# Patient Record
Sex: FEMALE | Race: BLACK OR AFRICAN AMERICAN | NOT HISPANIC OR LATINO | Employment: FULL TIME | URBAN - METROPOLITAN AREA
[De-identification: names, ages, dates, MRNs, and addresses within clinical notes are randomized per-mention and may not be internally consistent; named-entity substitution may affect disease eponyms.]

---

## 2020-03-19 ENCOUNTER — NURSE TRIAGE (OUTPATIENT)
Dept: ADMINISTRATIVE | Facility: CLINIC | Age: 25
End: 2020-03-19

## 2020-03-19 NOTE — TELEPHONE ENCOUNTER
"Patient called in with c/o sore throat x 4 days ago.patient rates throat pain 1/10.  Patient denied fever. Patient reports 4/10 headaches. Denied body aches, denied diarrhea, vomiting. Patient was advised per protocol and was advised to call back with questions, concerns or symptoms and verbalized understanding.     Reason for Disposition   [1] Sore throat is the only symptom AND [2] present > 48 hours    Additional Information   Negative: Severe difficulty breathing (e.g., struggling for each breath, speaks in single words, stridor)   Negative: Sounds like a life-threatening emergency to the triager   Negative: [1] Diagnosed strep throat AND [2] taking antibiotic AND [3] symptoms continue   Negative: Throat culture results, call about   Negative: Productive cough is main symptom   Negative: Non-productive cough is main symptom   Negative: Hoarseness is main symptom   Negative: Runny nose is main symptom   Negative: [1] Drooling or spitting out saliva (because can't swallow) AND [2] normal breathing   Negative: Unable to open mouth completely   Negative: [1] Difficulty breathing AND [2] not severe   Negative: Fever > 104 F (40 C)   Negative: [1] Refuses to drink anything AND [2] for > 12 hours   Negative: [1] Drinking very little AND [2] dehydration suspected (e.g., no urine > 12 hours, very dry mouth, very lightheaded)   Negative: Patient sounds very sick or weak to the triager   Negative: SEVERE (e.g., excruciating) throat pain   Negative: [1] Pus on tonsils (back of throat) AND [2]  fever AND [3] swollen neck lymph nodes ("glands")   Negative: [1] Rash AND [2] widespread (especially chest and abdomen)   Negative: Earache also present   Negative: Fever present > 3 days (72 hours)   Negative: Diabetes mellitus or weak immune system (e.g., HIV positive, cancer chemo, splenectomy, organ transplant)   Negative: History of rheumatic fever   Negative: [1] Adult is leaving on a trip AND [2] " requests an antibiotic NOW   Negative: [1] Positive throat culture or rapid strep test (according to lab, PCP, caller, etc.) AND [2] NO  standing order to call in prescription for antibiotic   Negative: [1] Exposure to family member (or spouse or boyfriend/girlfriend) with test-proven strep AND [2] within last 10 days    Protocols used: SORE THROAT-A-AH

## 2020-07-14 ENCOUNTER — OFFICE VISIT (OUTPATIENT)
Dept: OBSTETRICS AND GYNECOLOGY | Facility: CLINIC | Age: 25
End: 2020-07-14
Payer: MEDICAID

## 2020-07-14 VITALS
BODY MASS INDEX: 38.43 KG/M2 | SYSTOLIC BLOOD PRESSURE: 120 MMHG | DIASTOLIC BLOOD PRESSURE: 68 MMHG | WEIGHT: 245.38 LBS

## 2020-07-14 DIAGNOSIS — Z01.419 ENCOUNTER FOR WELL WOMAN EXAM WITH ROUTINE GYNECOLOGICAL EXAM: Primary | ICD-10-CM

## 2020-07-14 DIAGNOSIS — Z30.015 ENCOUNTER FOR INITIAL PRESCRIPTION OF VAGINAL RING HORMONAL CONTRACEPTIVE: ICD-10-CM

## 2020-07-14 DIAGNOSIS — Z11.3 SCREEN FOR STD (SEXUALLY TRANSMITTED DISEASE): ICD-10-CM

## 2020-07-14 LAB
B-HCG UR QL: NEGATIVE
CTP QC/QA: YES

## 2020-07-14 PROCEDURE — 99999 PR PBB SHADOW E&M-EST. PATIENT-LVL III: ICD-10-PCS | Mod: PBBFAC,,, | Performed by: NURSE PRACTITIONER

## 2020-07-14 PROCEDURE — 87491 CHLMYD TRACH DNA AMP PROBE: CPT

## 2020-07-14 PROCEDURE — 99385 PR PREVENTIVE VISIT,NEW,18-39: ICD-10-PCS | Mod: S$PBB,,, | Performed by: NURSE PRACTITIONER

## 2020-07-14 PROCEDURE — 88175 CYTOPATH C/V AUTO FLUID REDO: CPT

## 2020-07-14 PROCEDURE — 99385 PREV VISIT NEW AGE 18-39: CPT | Mod: S$PBB,,, | Performed by: NURSE PRACTITIONER

## 2020-07-14 PROCEDURE — 99213 OFFICE O/P EST LOW 20 MIN: CPT | Mod: PBBFAC,PN | Performed by: NURSE PRACTITIONER

## 2020-07-14 PROCEDURE — 99999 PR PBB SHADOW E&M-EST. PATIENT-LVL III: CPT | Mod: PBBFAC,,, | Performed by: NURSE PRACTITIONER

## 2020-07-14 RX ORDER — ETONOGESTREL AND ETHINYL ESTRADIOL VAGINAL RING .015; .12 MG/D; MG/D
1 RING VAGINAL
Qty: 1 EACH | Refills: 11 | Status: SHIPPED | OUTPATIENT
Start: 2020-07-14 | End: 2020-07-22 | Stop reason: SDUPTHER

## 2020-07-18 LAB
C TRACH DNA SPEC QL NAA+PROBE: NOT DETECTED
N GONORRHOEA DNA SPEC QL NAA+PROBE: NOT DETECTED

## 2020-07-22 DIAGNOSIS — Z30.015 ENCOUNTER FOR INITIAL PRESCRIPTION OF VAGINAL RING HORMONAL CONTRACEPTIVE: ICD-10-CM

## 2020-07-23 LAB
FINAL PATHOLOGIC DIAGNOSIS: NORMAL
Lab: NORMAL

## 2020-07-23 RX ORDER — ETONOGESTREL AND ETHINYL ESTRADIOL VAGINAL RING .015; .12 MG/D; MG/D
1 RING VAGINAL
Qty: 1 EACH | Refills: 11 | Status: SHIPPED | OUTPATIENT
Start: 2020-07-23 | End: 2021-08-05 | Stop reason: SDUPTHER

## 2020-07-24 ENCOUNTER — TELEPHONE (OUTPATIENT)
Dept: OBSTETRICS AND GYNECOLOGY | Facility: CLINIC | Age: 25
End: 2020-07-24

## 2020-07-24 NOTE — TELEPHONE ENCOUNTER
----- Message from ANTONIO Lopez sent at 7/23/2020  6:12 PM CDT -----  Pap is normal. Repeat in 3 years.

## 2020-08-07 ENCOUNTER — OFFICE VISIT (OUTPATIENT)
Dept: OBSTETRICS AND GYNECOLOGY | Facility: CLINIC | Age: 25
End: 2020-08-07
Payer: MEDICAID

## 2020-08-07 VITALS
BODY MASS INDEX: 38.88 KG/M2 | WEIGHT: 248.25 LBS | DIASTOLIC BLOOD PRESSURE: 76 MMHG | SYSTOLIC BLOOD PRESSURE: 124 MMHG

## 2020-08-07 DIAGNOSIS — N76.0 ACUTE VAGINITIS: Primary | ICD-10-CM

## 2020-08-07 PROCEDURE — 99213 OFFICE O/P EST LOW 20 MIN: CPT | Mod: PBBFAC,PN | Performed by: NURSE PRACTITIONER

## 2020-08-07 PROCEDURE — 99999 PR PBB SHADOW E&M-EST. PATIENT-LVL III: CPT | Mod: PBBFAC,,, | Performed by: NURSE PRACTITIONER

## 2020-08-07 PROCEDURE — 99999 PR PBB SHADOW E&M-EST. PATIENT-LVL III: ICD-10-PCS | Mod: PBBFAC,,, | Performed by: NURSE PRACTITIONER

## 2020-08-07 PROCEDURE — 99214 OFFICE O/P EST MOD 30 MIN: CPT | Mod: S$PBB,,, | Performed by: NURSE PRACTITIONER

## 2020-08-07 PROCEDURE — 99214 PR OFFICE/OUTPT VISIT, EST, LEVL IV, 30-39 MIN: ICD-10-PCS | Mod: S$PBB,,, | Performed by: NURSE PRACTITIONER

## 2020-08-07 RX ORDER — FLUCONAZOLE 150 MG/1
150 TABLET ORAL ONCE
Qty: 1 TABLET | Refills: 1 | Status: SHIPPED | OUTPATIENT
Start: 2020-08-07 | End: 2020-08-07

## 2020-08-14 ENCOUNTER — TELEPHONE (OUTPATIENT)
Dept: OBSTETRICS AND GYNECOLOGY | Facility: CLINIC | Age: 25
End: 2020-08-14

## 2020-08-14 NOTE — TELEPHONE ENCOUNTER
----- Message from Queta Mckeon sent at 8/14/2020 12:48 PM CDT -----  Regarding: MEDICATION  Name of Who is Calling: POORNIMA MCKENO [[id number]]      What is the request in detail: patient is requesting a call back she states she was suppose to have (DIFLUCAN) 150 MG  medication called in for her and it has not been called in yet       Can the clinic reply by [facility]: no      What Number to Call Back if not in [facility]: [phone]

## 2020-10-22 NOTE — PROGRESS NOTES
Chief Complaint: Well Woman Exam     HPI:      Ghada Mckeon is a 25 y.o.  who presents today for well woman exam.  LMP: Patient's last menstrual period was 07/10/2020.   Patient is currently sexually active with a single male partner. She is currently using condoms for contraception. She would like STD screening today. Ms. Mckeon confirms that she is safe at home.  Ms. Mckeon denies abnormal vaginal bleeding, discharge, pelvic pain, urinary problems, or changes in appetite.    Previous Pap:  WNL per pt (~2 years) Denies hx of abnormal paps    Gardasil:Completed     Family History   Problem Relation Age of Onset    Breast cancer Neg Hx     Colon cancer Neg Hx     Ovarian cancer Neg Hx      OB History        2    Para   1    Term   1            AB        Living   2       SAB        TAB        Ectopic        Multiple        Live Births   1                 ROS:     GENERAL: Denies unintentional weight gain or weight loss. Feeling well overall.   SKIN: Denies rash or lesions.   HEENT: Denies headaches, or vision changes.   CARDIOVASCULAR: Denies palpitations or chest pain.   RESPIRATORY: Denies shortness of breath or dyspnea on exertion.  BREASTS: Denies pain, lumps, or nipple discharge.   ABDOMEN: Denies abdominal pain, constipation, diarrhea, nausea, vomiting, change in appetite.  URINARY: Denies frequency, dysuria, hematuria.  NEUROLOGIC: Denies syncope or weakness.   PSYCHIATRIC: Denies depression, anxiety or mood swings.    Physical Exam:      PHYSICAL EXAM:  /68   Wt 111.3 kg (245 lb 6 oz)   LMP 07/10/2020   BMI 38.43 kg/m²   Body mass index is 38.43 kg/m².     APPEARANCE: Well nourished, well developed, in no acute distress.  PSYCH: Appropriate mood and affect.  SKIN: No acne or hirsutism  NECK: Neck symmetric without masses or thyromegaly  NODES: No inguinal, axillary, or supraclavicular lymph node enlargement  ABDOMEN: Soft.  No tenderness or masses.    CARDIOVASCULAR:  No edema of peripheral extremities  BREASTS: Symmetrical, no skin changes or visible lesions.  No palpable masses or nipple discharge bilaterally.  PELVIC: Normal external genitalia without lesions.  Normal hair distribution.  Adequate perineal body, normal urethral meatus.  Vagina moist and well rugated without lesions or discharge.  Cervix pink, without lesions, discharge or tenderness.  No significant cystocele or rectocele.  Bimanual exam shows uterus to be normal size, regular, mobile and nontender.  Adnexa without masses or tenderness.      Assessment/Plan:     Encounter for well woman exam with routine gynecological exam  -     Liquid-Based Pap Smear, Screening    Screen for STD (sexually transmitted disease)  -     C. trachomatis/N. gonorrhoeae by AMP DNA    Encounter for initial prescription of vaginal ring hormonal contraceptive  -     POCT urine pregnancy  -     Discontinue: etonogestreL-ethinyl estradioL (NUVARING) 0.12-0.015 mg/24 hr vaginal ring; Place 1 each vaginally every 21 days. Insert one (1) ring vaginally and leave in place for three (3) weeks, then remove for one (1) week.  Dispense: 1 each; Refill: 11        Counseling:     Patient was counseled today on current ASCCP pap guidelines, the recommendation for yearly pelvic exams, healthy diet and exercise routines, breast self awareness.She is to see her PCP for other health maintenance.     The use of hormonal contraception has been fully discussed with the patient. We discussed all options including OCPs, transdermal patches, vaginal ring, Depo Provera injections, Implanon, and IUD. Warnings about anticipated minor side effects such as breakthrough spotting, nausea, breast tenderness, weight changes, acne, headaches, etc were given. She has been told of the more serious potential side effects such as MI, stroke, and deep vein thrombosis, all of which are very unlikely. She has been asked to report any signs of such serious problems  immediately. The need for additional protection, such as a condom, to prevent exposure to sexually transmitted diseases has also been discussed- the patient has been clearly reminded that no hormonal contraceptive method can protect her against diseases such as HIV and others. She understands and wishes to take the medication as prescribed. She wishes to begin Nuvaring (estrogen/progesterone).

## 2021-02-26 ENCOUNTER — IMMUNIZATION (OUTPATIENT)
Dept: PRIMARY CARE CLINIC | Facility: CLINIC | Age: 26
End: 2021-02-26
Payer: MEDICAID

## 2021-02-26 DIAGNOSIS — Z23 NEED FOR VACCINATION: Primary | ICD-10-CM

## 2021-02-26 PROCEDURE — 0011A COVID-19, MRNA, LNP-S, PF, 100 MCG/0.5 ML DOSE VACCINE: CPT | Mod: PBBFAC,PN

## 2021-03-26 ENCOUNTER — IMMUNIZATION (OUTPATIENT)
Dept: PRIMARY CARE CLINIC | Facility: CLINIC | Age: 26
End: 2021-03-26
Payer: MEDICAID

## 2021-03-26 DIAGNOSIS — Z23 NEED FOR VACCINATION: Primary | ICD-10-CM

## 2021-03-26 PROCEDURE — 0012A COVID-19, MRNA, LNP-S, PF, 100 MCG/0.5 ML DOSE VACCINE: ICD-10-PCS | Mod: CV19,,, | Performed by: FAMILY MEDICINE

## 2021-03-26 PROCEDURE — 91301 COVID-19, MRNA, LNP-S, PF, 100 MCG/0.5 ML DOSE VACCINE: CPT | Mod: ,,, | Performed by: FAMILY MEDICINE

## 2021-03-26 PROCEDURE — 91301 COVID-19, MRNA, LNP-S, PF, 100 MCG/0.5 ML DOSE VACCINE: ICD-10-PCS | Mod: ,,, | Performed by: FAMILY MEDICINE

## 2021-03-26 PROCEDURE — 0012A COVID-19, MRNA, LNP-S, PF, 100 MCG/0.5 ML DOSE VACCINE: CPT | Mod: CV19,,, | Performed by: FAMILY MEDICINE

## 2021-04-09 ENCOUNTER — TELEPHONE (OUTPATIENT)
Dept: OBSTETRICS AND GYNECOLOGY | Facility: CLINIC | Age: 26
End: 2021-04-09

## 2021-08-04 ENCOUNTER — PATIENT MESSAGE (OUTPATIENT)
Dept: OBSTETRICS AND GYNECOLOGY | Facility: CLINIC | Age: 26
End: 2021-08-04

## 2021-08-05 DIAGNOSIS — Z30.015 ENCOUNTER FOR INITIAL PRESCRIPTION OF VAGINAL RING HORMONAL CONTRACEPTIVE: ICD-10-CM

## 2021-08-05 RX ORDER — ETONOGESTREL AND ETHINYL ESTRADIOL VAGINAL RING .015; .12 MG/D; MG/D
1 RING VAGINAL
Qty: 1 EACH | Refills: 0 | Status: SHIPPED | OUTPATIENT
Start: 2021-08-05 | End: 2021-11-29 | Stop reason: SDUPTHER

## 2021-11-29 ENCOUNTER — PATIENT MESSAGE (OUTPATIENT)
Dept: OBSTETRICS AND GYNECOLOGY | Facility: CLINIC | Age: 26
End: 2021-11-29

## 2021-11-29 ENCOUNTER — OFFICE VISIT (OUTPATIENT)
Dept: OBSTETRICS AND GYNECOLOGY | Facility: CLINIC | Age: 26
End: 2021-11-29
Payer: MEDICAID

## 2021-11-29 VITALS
BODY MASS INDEX: 41.25 KG/M2 | HEIGHT: 67 IN | DIASTOLIC BLOOD PRESSURE: 82 MMHG | SYSTOLIC BLOOD PRESSURE: 118 MMHG | WEIGHT: 262.81 LBS

## 2021-11-29 DIAGNOSIS — Z30.015 ENCOUNTER FOR INITIAL PRESCRIPTION OF VAGINAL RING HORMONAL CONTRACEPTIVE: ICD-10-CM

## 2021-11-29 DIAGNOSIS — N89.8 VAGINAL DISCHARGE: ICD-10-CM

## 2021-11-29 DIAGNOSIS — Z01.419 WELL WOMAN EXAM WITH ROUTINE GYNECOLOGICAL EXAM: Primary | ICD-10-CM

## 2021-11-29 DIAGNOSIS — N92.6 MISSED MENSES: ICD-10-CM

## 2021-11-29 DIAGNOSIS — Z11.3 ROUTINE SCREENING FOR STI (SEXUALLY TRANSMITTED INFECTION): ICD-10-CM

## 2021-11-29 LAB
B-HCG UR QL: NEGATIVE
CTP QC/QA: YES

## 2021-11-29 PROCEDURE — 99395 PREV VISIT EST AGE 18-39: CPT | Mod: S$PBB,,, | Performed by: STUDENT IN AN ORGANIZED HEALTH CARE EDUCATION/TRAINING PROGRAM

## 2021-11-29 PROCEDURE — 87661 TRICHOMONAS VAGINALIS AMPLIF: CPT | Mod: 59 | Performed by: STUDENT IN AN ORGANIZED HEALTH CARE EDUCATION/TRAINING PROGRAM

## 2021-11-29 PROCEDURE — 99395 PR PREVENTIVE VISIT,EST,18-39: ICD-10-PCS | Mod: S$PBB,,, | Performed by: STUDENT IN AN ORGANIZED HEALTH CARE EDUCATION/TRAINING PROGRAM

## 2021-11-29 PROCEDURE — 99999 PR PBB SHADOW E&M-EST. PATIENT-LVL III: ICD-10-PCS | Mod: PBBFAC,,, | Performed by: STUDENT IN AN ORGANIZED HEALTH CARE EDUCATION/TRAINING PROGRAM

## 2021-11-29 PROCEDURE — 87481 CANDIDA DNA AMP PROBE: CPT | Mod: 59 | Performed by: STUDENT IN AN ORGANIZED HEALTH CARE EDUCATION/TRAINING PROGRAM

## 2021-11-29 PROCEDURE — 81025 URINE PREGNANCY TEST: CPT | Mod: PBBFAC,PN | Performed by: STUDENT IN AN ORGANIZED HEALTH CARE EDUCATION/TRAINING PROGRAM

## 2021-11-29 PROCEDURE — 87591 N.GONORRHOEAE DNA AMP PROB: CPT | Mod: 59 | Performed by: STUDENT IN AN ORGANIZED HEALTH CARE EDUCATION/TRAINING PROGRAM

## 2021-11-29 PROCEDURE — 87491 CHLMYD TRACH DNA AMP PROBE: CPT | Performed by: STUDENT IN AN ORGANIZED HEALTH CARE EDUCATION/TRAINING PROGRAM

## 2021-11-29 PROCEDURE — 99213 OFFICE O/P EST LOW 20 MIN: CPT | Mod: PBBFAC,PN | Performed by: STUDENT IN AN ORGANIZED HEALTH CARE EDUCATION/TRAINING PROGRAM

## 2021-11-29 PROCEDURE — 99999 PR PBB SHADOW E&M-EST. PATIENT-LVL III: CPT | Mod: PBBFAC,,, | Performed by: STUDENT IN AN ORGANIZED HEALTH CARE EDUCATION/TRAINING PROGRAM

## 2021-11-29 RX ORDER — ETONOGESTREL AND ETHINYL ESTRADIOL VAGINAL RING .015; .12 MG/D; MG/D
1 RING VAGINAL
Qty: 1 EACH | Refills: 11 | Status: SHIPPED | OUTPATIENT
Start: 2021-11-29 | End: 2023-05-10 | Stop reason: CLARIF

## 2021-12-02 LAB
C TRACH DNA SPEC QL NAA+PROBE: NOT DETECTED
N GONORRHOEA DNA SPEC QL NAA+PROBE: NOT DETECTED

## 2021-12-04 LAB
BACTERIAL VAGINOSIS DNA: NEGATIVE
CANDIDA GLABRATA DNA: NEGATIVE
CANDIDA KRUSEI DNA: NEGATIVE
CANDIDA RRNA VAG QL PROBE: POSITIVE
T VAGINALIS RRNA GENITAL QL PROBE: NEGATIVE

## 2021-12-06 RX ORDER — FLUCONAZOLE 150 MG/1
150 TABLET ORAL ONCE
Qty: 1 TABLET | Refills: 1 | Status: SHIPPED | OUTPATIENT
Start: 2021-12-06 | End: 2021-12-06

## 2021-12-21 ENCOUNTER — LAB VISIT (OUTPATIENT)
Dept: PRIMARY CARE CLINIC | Facility: OTHER | Age: 26
End: 2021-12-21
Payer: MEDICAID

## 2021-12-21 DIAGNOSIS — R68.83 CHILLS: ICD-10-CM

## 2021-12-21 DIAGNOSIS — R68.83 CHILLS: Primary | ICD-10-CM

## 2021-12-21 LAB
CTP QC/QA: YES
SARS-COV-2 RDRP RESP QL NAA+PROBE: POSITIVE

## 2021-12-21 PROCEDURE — U0002 COVID-19 LAB TEST NON-CDC: HCPCS | Mod: QW,,, | Performed by: PREVENTIVE MEDICINE

## 2021-12-21 PROCEDURE — U0002: ICD-10-PCS | Mod: QW,,, | Performed by: PREVENTIVE MEDICINE

## 2021-12-24 ENCOUNTER — PATIENT MESSAGE (OUTPATIENT)
Dept: PRIMARY CARE CLINIC | Facility: CLINIC | Age: 26
End: 2021-12-24
Payer: MEDICAID

## 2022-01-04 ENCOUNTER — OFFICE VISIT (OUTPATIENT)
Dept: PRIMARY CARE CLINIC | Facility: CLINIC | Age: 27
End: 2022-01-04
Payer: MEDICAID

## 2022-01-04 ENCOUNTER — PATIENT MESSAGE (OUTPATIENT)
Dept: OBSTETRICS AND GYNECOLOGY | Facility: CLINIC | Age: 27
End: 2022-01-04
Payer: MEDICAID

## 2022-01-04 VITALS
DIASTOLIC BLOOD PRESSURE: 76 MMHG | RESPIRATION RATE: 16 BRPM | HEART RATE: 90 BPM | TEMPERATURE: 98 F | WEIGHT: 263.31 LBS | OXYGEN SATURATION: 97 % | HEIGHT: 67 IN | BODY MASS INDEX: 41.33 KG/M2 | SYSTOLIC BLOOD PRESSURE: 120 MMHG

## 2022-01-04 DIAGNOSIS — U07.1 COVID-19 VIRUS INFECTION: Primary | ICD-10-CM

## 2022-01-04 DIAGNOSIS — R05.8 POST-VIRAL COUGH SYNDROME: ICD-10-CM

## 2022-01-04 DIAGNOSIS — G47.00 INSOMNIA, UNSPECIFIED TYPE: ICD-10-CM

## 2022-01-04 PROCEDURE — 3078F PR MOST RECENT DIASTOLIC BLOOD PRESSURE < 80 MM HG: ICD-10-PCS | Mod: CPTII,,, | Performed by: STUDENT IN AN ORGANIZED HEALTH CARE EDUCATION/TRAINING PROGRAM

## 2022-01-04 PROCEDURE — 99999 PR PBB SHADOW E&M-EST. PATIENT-LVL IV: ICD-10-PCS | Mod: PBBFAC,,, | Performed by: STUDENT IN AN ORGANIZED HEALTH CARE EDUCATION/TRAINING PROGRAM

## 2022-01-04 PROCEDURE — 3074F SYST BP LT 130 MM HG: CPT | Mod: CPTII,,, | Performed by: STUDENT IN AN ORGANIZED HEALTH CARE EDUCATION/TRAINING PROGRAM

## 2022-01-04 PROCEDURE — 99214 OFFICE O/P EST MOD 30 MIN: CPT | Mod: PBBFAC,PN | Performed by: STUDENT IN AN ORGANIZED HEALTH CARE EDUCATION/TRAINING PROGRAM

## 2022-01-04 PROCEDURE — 3074F PR MOST RECENT SYSTOLIC BLOOD PRESSURE < 130 MM HG: ICD-10-PCS | Mod: CPTII,,, | Performed by: STUDENT IN AN ORGANIZED HEALTH CARE EDUCATION/TRAINING PROGRAM

## 2022-01-04 PROCEDURE — 1159F PR MEDICATION LIST DOCUMENTED IN MEDICAL RECORD: ICD-10-PCS | Mod: CPTII,,, | Performed by: STUDENT IN AN ORGANIZED HEALTH CARE EDUCATION/TRAINING PROGRAM

## 2022-01-04 PROCEDURE — 99999 PR PBB SHADOW E&M-EST. PATIENT-LVL IV: CPT | Mod: PBBFAC,,, | Performed by: STUDENT IN AN ORGANIZED HEALTH CARE EDUCATION/TRAINING PROGRAM

## 2022-01-04 PROCEDURE — 3008F PR BODY MASS INDEX (BMI) DOCUMENTED: ICD-10-PCS | Mod: CPTII,,, | Performed by: STUDENT IN AN ORGANIZED HEALTH CARE EDUCATION/TRAINING PROGRAM

## 2022-01-04 PROCEDURE — 3008F BODY MASS INDEX DOCD: CPT | Mod: CPTII,,, | Performed by: STUDENT IN AN ORGANIZED HEALTH CARE EDUCATION/TRAINING PROGRAM

## 2022-01-04 PROCEDURE — 1160F PR REVIEW ALL MEDS BY PRESCRIBER/CLIN PHARMACIST DOCUMENTED: ICD-10-PCS | Mod: CPTII,,, | Performed by: STUDENT IN AN ORGANIZED HEALTH CARE EDUCATION/TRAINING PROGRAM

## 2022-01-04 PROCEDURE — 99214 PR OFFICE/OUTPT VISIT, EST, LEVL IV, 30-39 MIN: ICD-10-PCS | Mod: S$PBB,,, | Performed by: STUDENT IN AN ORGANIZED HEALTH CARE EDUCATION/TRAINING PROGRAM

## 2022-01-04 PROCEDURE — 3078F DIAST BP <80 MM HG: CPT | Mod: CPTII,,, | Performed by: STUDENT IN AN ORGANIZED HEALTH CARE EDUCATION/TRAINING PROGRAM

## 2022-01-04 PROCEDURE — 1159F MED LIST DOCD IN RCRD: CPT | Mod: CPTII,,, | Performed by: STUDENT IN AN ORGANIZED HEALTH CARE EDUCATION/TRAINING PROGRAM

## 2022-01-04 PROCEDURE — 1160F RVW MEDS BY RX/DR IN RCRD: CPT | Mod: CPTII,,, | Performed by: STUDENT IN AN ORGANIZED HEALTH CARE EDUCATION/TRAINING PROGRAM

## 2022-01-04 PROCEDURE — 99214 OFFICE O/P EST MOD 30 MIN: CPT | Mod: S$PBB,,, | Performed by: STUDENT IN AN ORGANIZED HEALTH CARE EDUCATION/TRAINING PROGRAM

## 2022-01-04 RX ORDER — MELATONIN 5 MG
1 CAPSULE ORAL NIGHTLY
Qty: 90 CAPSULE | Refills: 1
Start: 2022-01-04 | End: 2023-05-10 | Stop reason: CLARIF

## 2022-01-04 RX ORDER — BENZONATATE 100 MG/1
100-200 CAPSULE ORAL 3 TIMES DAILY PRN
Qty: 40 CAPSULE | Refills: 0 | Status: SHIPPED | OUTPATIENT
Start: 2022-01-04 | End: 2022-01-14

## 2022-01-04 NOTE — LETTER
January 4, 2022      [facility] [id number], [facility] [place] [address]  [facility] [address]  Phone: [phone]  Fax: [phone]       Patient: Ghada Mckeon   YOB: 1995  Date of Visit: 01/04/2022    To Whom It May Concern:    Yaritza Mckeon  was at [facility] on 01/04/2022. The patient may return to work/school on 01/04/2022 with no restrictions. If you have any questions or concerns, or if I can be of further assistance, please do not hesitate to contact me.    Sincerely,            Elmer Souza MD

## 2022-01-04 NOTE — LETTER
January 4, 2022      [facility] [id number], [place] [address]  [place] [address]  Phone: [phone]  Fax: [phone]       Patient: Ghada Mckeon   YOB: 1995  Date of Visit: 01/04/2022    To Whom It May Concern:    Yaritza Mckeon  was at [facility] on 01/04/2022. The patient has surpassed the normal quarantine period of 5 days due to persistent symptoms (chills, sore throat, cough and fatigue) from 12/21/2021-1/04/2022. Patient is now cleared to return to work on 1/04/2022.  If you have any questions or concerns, or if I can be of further assistance, please do not hesitate to contact me.    Sincerely,          Elmer Souza

## 2022-01-04 NOTE — PROGRESS NOTES
"Subjective:           Patient ID: Ghada Mckeon is a 26 y.o. female who presents today with a chief complaint of work clearance.    Chief Complaint:   work clearance      History of Present Illness:    26-year-old female presenting to clinic today for clearance to return to work after COVID infection.    Patient had positive COVID test on 12/21/2021 (14 days ago), patient has some residual cough but denies other symptoms at this time.    States that 1 week after postive test was still having chills, fatigue, sore throat and cough.  Over last 2-3 days s/s had decreased and patient's only current complaint it a residual post-viral cough.     Today patient's vitals show SpO2 is 97%.  Temperature is 98.3°.      Review of Systems   Constitutional: Negative for activity change, fatigue, fever and unexpected weight change.   HENT: Negative for congestion, nosebleeds, sinus pressure and sneezing.    Respiratory: Positive for cough. Negative for shortness of breath and wheezing.    Cardiovascular: Negative for chest pain, palpitations and leg swelling.   Gastrointestinal: Negative for abdominal distention, constipation, diarrhea and nausea.   Genitourinary: Negative for difficulty urinating and dysuria.   Musculoskeletal: Negative for back pain and gait problem.   Skin: Negative for pallor and rash.   Neurological: Negative for weakness, numbness and headaches.   Psychiatric/Behavioral: Negative for agitation. The patient is not nervous/anxious.            Objective:        Vitals:    01/04/22 0850   BP: 120/76   BP Location: Right arm   Patient Position: Sitting   BP Method: Large (Manual)   Pulse: 90   Resp: 16   Temp: 98.3 °F (36.8 °C)   TempSrc: Oral   SpO2: 97%   Weight: 119.4 kg (263 lb 5.4 oz)   Height: 5' 7" (1.702 m)       Body mass index is 41.24 kg/m².      Physical Exam  Vitals reviewed.   Constitutional:       General: She is not in acute distress.     Appearance: Normal appearance. She is obese.   HENT:     "  Head: Normocephalic and atraumatic.      Right Ear: External ear normal.      Left Ear: External ear normal.      Nose: No rhinorrhea.      Mouth/Throat:      Mouth: Mucous membranes are moist.      Pharynx: No posterior oropharyngeal erythema.   Eyes:      Extraocular Movements: Extraocular movements intact.      Conjunctiva/sclera: Conjunctivae normal.   Cardiovascular:      Rate and Rhythm: Normal rate and regular rhythm.      Pulses: Normal pulses.      Heart sounds: No murmur heard.      Pulmonary:      Effort: Pulmonary effort is normal. No respiratory distress.   Musculoskeletal:      Right lower leg: No edema.      Left lower leg: No edema.   Lymphadenopathy:      Cervical: No cervical adenopathy.   Skin:     Coloration: Skin is not jaundiced.      Findings: No bruising.   Neurological:      General: No focal deficit present.      Mental Status: She is alert and oriented to person, place, and time.      Motor: No weakness.      Gait: Gait normal.   Psychiatric:         Mood and Affect: Mood normal.             Lab Results   Component Value Date     07/21/2015    K 3.6 07/21/2015     07/21/2015    CO2 21 (L) 07/21/2015    BUN 10 07/21/2015    CREATININE 0.7 07/21/2015    ANIONGAP 10 07/21/2015     No results found for: HGBA1C  Lab Results   Component Value Date    BNP 27 11/03/2014       Lab Results   Component Value Date    WBC 5.57 07/21/2015    HGB 10.6 (L) 07/21/2015    HCT 33.3 (L) 07/21/2015     (H) 07/21/2015    GRAN 2.6 07/21/2015    GRAN 46.6 07/21/2015     No results found for: CHOL, HDL, LDLCALC, TRIG       Current Outpatient Medications:     etonogestreL-ethinyl estradioL (NUVARING) 0.12-0.015 mg/24 hr vaginal ring, Place 1 each vaginally every 21 days. Insert one (1) ring vaginally and leave in place for three (3) weeks, then remove for one (1) week., Disp: 1 each, Rfl: 11    benzonatate (TESSALON) 100 MG capsule, Take 1-2 capsules (100-200 mg total) by mouth 3 (three) times  daily as needed for Cough., Disp: 40 capsule, Rfl: 0     Outpatient Encounter Medications as of 1/4/2022   Medication Sig Dispense Refill    etonogestreL-ethinyl estradioL (NUVARING) 0.12-0.015 mg/24 hr vaginal ring Place 1 each vaginally every 21 days. Insert one (1) ring vaginally and leave in place for three (3) weeks, then remove for one (1) week. 1 each 11    benzonatate (TESSALON) 100 MG capsule Take 1-2 capsules (100-200 mg total) by mouth 3 (three) times daily as needed for Cough. 40 capsule 0    [DISCONTINUED] omeprazole (PRILOSEC) 20 MG capsule Take 1 capsule (20 mg total) by mouth once daily. for 14 days 14 capsule 0     No facility-administered encounter medications on file as of 1/4/2022.          Assessment:       1. COVID-19 virus infection    2. Post-viral cough syndrome    3. Insomnia, unspecified type           Plan:       COVID-19 virus infection    Post-viral cough syndrome  -     benzonatate (TESSALON) 100 MG capsule; Take 1-2 capsules (100-200 mg total) by mouth 3 (three) times daily as needed for Cough.  Dispense: 40 capsule; Refill: 0    Insomnia, unspecified type       COVID infection:   - test positive for COVID on 12/21/2021.   - was initially having symptoms of chills, fatigue, sore throat and cough.  The last 48 hours patient's symptoms have been improving, only has a postviral cough that still persists.  Otherwise denies complaints.   - patient has been offered Tessalon Perles to help address her cough.   - patient is afebrile, has normal SpO2, clear lungs.   - patient is now referred clear to return to work with normal COVID restrictions including mask wearing and frequent hand washing.   - she is being provided a work clearance note to provide to her employer.    Insomnia:   - patient has been waking at night, states she has increased snoring.  Thinks this may be related to some weight gain.   - interested in possibly using melatonin, advised that she take melatonin 5 mg or 6 mg  nightly, 30 minutes before bed.  After 3-4 nights will begin to see more of an effect from medication.  Should follow sleep hygiene instruction below.    SLEEP HYGIENE-   After 3 pm - Avoid caffeine (coffee, tea, soft drinks ,energy drinks)   After 6 pm - Avoid liquids (so you're not waking up to urinate)  After 8 pm - Avoid screens that emit light & stimulate your brain. Don't look at phone, computer or TV  It's ok to listen to background noise machine (waves, rain, audio books where voice is low)  Keep your room dark & use an alarm clock that isn't bright.   Do not take a nap  Exercise your body for 20 minutes EVERY DAY , so it's exhausted to sleep    Try to stay in your bed for 7-8 hours per night, instead of getting out of bed (which awakens your body when you're standing upright &  wakes up your brain, affecting your normal circadian rhythm)  ================    We are no longer prescribing sleep medications - Ambien, Lunesta, Sonata, Restoril -- because of studies showing a possible increased risk of death.     You can try these safe over the counter sleep aids-   Tranquil Sleep by Sleep Relax (5HTP, Suntheanine & Melatonin),  Prosom, Tylenol PM, Melatonin. Aerobic exercise helps to exhaust the body & relax into deeper sleep.     Please make an appointment if you'd like to discuss a prescription medications that could also be helpful such as Trazodone . It is a low dose of serotonin medication (similar to Lexapro, Celexa, Zoloft, Prozac) , mainly helping to calm our thoughts & worries & lists of things going through our head that keep us from either going to sleep or sleeping restfully    Weight Loss:   - Body mass index is 41.24 kg/m².   - Normal weight is BMI 18-25, Overweight 25-30, and Obesity is 30+.   - would recommend weight loss for improved overall health.   - recommended moderate weight change, 1-2lbs per weeks.   - focus on eating a healthy sustainable diet.  Use food diary.   - consider jean such as  ""Lose It" or "Noom".   - avoid empty calories that you may use daily from items such as like soda, sweet tea, sugary coffee, ice cream or candy.  An occasional piece of birthday cake is not the cause of obesity, but a daily Frappaccino could be to blame.    - Exercise has many benefits (heart health, improved mood/energy, higher self esteem, less depression, greater strength/flexibility, better sleep, less stress/anxiety, improved immune system, stronger bones, improved cognition, fewer colds/asthma exacerbations), it also does help lose weight.  But weight loss from exercise is much less impactful than when a change in diet can achieve.  Exercise is highly encouraged, but diet change should be the primary tool used to lose weight.                 "

## 2022-01-04 NOTE — PATIENT INSTRUCTIONS
COVID infection:   - test positive for COVID on 12/21/2021.   - was initially having symptoms of chills, fatigue, sore throat and cough.  The last 48 hours patient's symptoms have been improving, only has a postviral cough that still persists.  Otherwise denies complaints.   - patient has been offered Tessalon Perles to help address her cough.   - patient is afebrile, has normal SpO2, clear lungs.   - patient is now referred clear to return to work with normal COVID restrictions including mask wearing and frequent hand washing.   - she is being provided a work clearance note to provide to her employer.    Insomnia:   - patient has been waking at night, states she has increased snoring.  Thinks this may be related to some weight gain.   - interested in possibly using melatonin, advised that she take melatonin 5 mg or 6 mg nightly, 30 minutes before bed.  After 3-4 nights will begin to see more of an effect from medication.  Should follow sleep hygiene instruction below.    SLEEP HYGIENE-   After 3 pm - Avoid caffeine (coffee, tea, soft drinks ,energy drinks)   After 6 pm - Avoid liquids (so you're not waking up to urinate)  After 8 pm - Avoid screens that emit light & stimulate your brain. Don't look at phone, computer or TV  It's ok to listen to background noise machine (waves, rain, audio books where voice is low)  Keep your room dark & use an alarm clock that isn't bright.   Do not take a nap  Exercise your body for 20 minutes EVERY DAY , so it's exhausted to sleep    Try to stay in your bed for 7-8 hours per night, instead of getting out of bed (which awakens your body when you're standing upright &  wakes up your brain, affecting your normal circadian rhythm)  ================    We are no longer prescribing sleep medications - Ambien, Lunesta, Sonata, Restoril -- because of studies showing a possible increased risk of death.     You can try these safe over the counter sleep aids-   Tranquil Sleep by Sleep Relax  "(5HTP, Suntheanine & Melatonin),  Prosom, Tylenol PM, Melatonin. Aerobic exercise helps to exhaust the body & relax into deeper sleep.     Please make an appointment if you'd like to discuss a prescription medications that could also be helpful such as Trazodone . It is a low dose of serotonin medication (similar to Lexapro, Celexa, Zoloft, Prozac) , mainly helping to calm our thoughts & worries & lists of things going through our head that keep us from either going to sleep or sleeping restfully    Weight Loss:   - Body mass index is 41.24 kg/m².   - Normal weight is BMI 18-25, Overweight 25-30, and Obesity is 30+.   - would recommend weight loss for improved overall health.   - recommended moderate weight change, 1-2lbs per weeks.   - focus on eating a healthy sustainable diet.  Use food diary.   - consider jean such as "Lose It" or "Noom".   - avoid empty calories that you may use daily from items such as like soda, sweet tea, sugary coffee, ice cream or candy.  An occasional piece of birthday cake is not the cause of obesity, but a daily Frappaccino could be to blame.    - Exercise has many benefits (heart health, improved mood/energy, higher self esteem, less depression, greater strength/flexibility, better sleep, less stress/anxiety, improved immune system, stronger bones, improved cognition, fewer colds/asthma exacerbations), it also does help lose weight.  But weight loss from exercise is much less impactful than when a change in diet can achieve.  Exercise is highly encouraged, but diet change should be the primary tool used to lose weight.     "

## 2022-01-05 DIAGNOSIS — Z20.828 EXPOSURE TO HERPES SIMPLEX VIRUS (HSV): Primary | ICD-10-CM

## 2022-01-25 ENCOUNTER — PATIENT MESSAGE (OUTPATIENT)
Dept: OBSTETRICS AND GYNECOLOGY | Facility: CLINIC | Age: 27
End: 2022-01-25
Payer: MEDICAID

## 2023-02-02 ENCOUNTER — PATIENT MESSAGE (OUTPATIENT)
Dept: OBSTETRICS AND GYNECOLOGY | Facility: CLINIC | Age: 28
End: 2023-02-02
Payer: MEDICAID

## 2023-04-07 ENCOUNTER — HOSPITAL ENCOUNTER (EMERGENCY)
Facility: HOSPITAL | Age: 28
Discharge: HOME | End: 2023-04-07
Attending: EMERGENCY MEDICINE
Payer: MEDICAID

## 2023-04-07 VITALS
WEIGHT: 264 LBS | RESPIRATION RATE: 18 BRPM | DIASTOLIC BLOOD PRESSURE: 85 MMHG | TEMPERATURE: 100 F | HEART RATE: 117 BPM | SYSTOLIC BLOOD PRESSURE: 153 MMHG | BODY MASS INDEX: 41.44 KG/M2 | OXYGEN SATURATION: 96 % | HEIGHT: 67 IN

## 2023-04-07 DIAGNOSIS — J02.0 STREP PHARYNGITIS: Primary | ICD-10-CM

## 2023-04-07 LAB
B-HCG UR QL: NEGATIVE
CTP QC/QA: YES
CTP QC/QA: YES
MOLECULAR STREP A: POSITIVE

## 2023-04-07 PROCEDURE — 25000003 PHARM REV CODE 250

## 2023-04-07 PROCEDURE — 25000003 PHARM REV CODE 250: Performed by: NURSE PRACTITIONER

## 2023-04-07 PROCEDURE — 87651 STREP A DNA AMP PROBE: CPT

## 2023-04-07 PROCEDURE — 81025 URINE PREGNANCY TEST: CPT | Performed by: NURSE PRACTITIONER

## 2023-04-07 PROCEDURE — 99284 EMERGENCY DEPT VISIT MOD MDM: CPT

## 2023-04-07 RX ORDER — PENICILLIN V POTASSIUM 250 MG/1
500 TABLET, FILM COATED ORAL
Status: COMPLETED | OUTPATIENT
Start: 2023-04-07 | End: 2023-04-07

## 2023-04-07 RX ORDER — IBUPROFEN 600 MG/1
600 TABLET ORAL EVERY 6 HOURS PRN
Qty: 20 TABLET | Refills: 0 | OUTPATIENT
Start: 2023-04-07 | End: 2023-05-08

## 2023-04-07 RX ORDER — PHENOL 1.4 %
AEROSOL, SPRAY (ML) MUCOUS MEMBRANE
Qty: 177 ML | Refills: 0 | OUTPATIENT
Start: 2023-04-07 | End: 2023-05-08

## 2023-04-07 RX ORDER — ACETAMINOPHEN 500 MG
500 TABLET ORAL EVERY 6 HOURS PRN
Qty: 28 TABLET | Refills: 0 | Status: SHIPPED | OUTPATIENT
Start: 2023-04-07 | End: 2023-04-14

## 2023-04-07 RX ORDER — ACETAMINOPHEN 325 MG/1
650 TABLET ORAL
Status: COMPLETED | OUTPATIENT
Start: 2023-04-07 | End: 2023-04-07

## 2023-04-07 RX ORDER — PENICILLIN V POTASSIUM 500 MG/1
500 TABLET, FILM COATED ORAL 3 TIMES DAILY
Qty: 30 TABLET | Refills: 0 | Status: SHIPPED | OUTPATIENT
Start: 2023-04-07 | End: 2023-04-17

## 2023-04-07 RX ADMIN — ACETAMINOPHEN 650 MG: 325 TABLET ORAL at 05:04

## 2023-04-07 RX ADMIN — PENICILLIN V POTASSIUM 500 MG: 250 TABLET, FILM COATED ORAL at 05:04

## 2023-04-07 NOTE — DISCHARGE INSTRUCTIONS
Thank you for coming to our Emergency Department today. It is important to remember that some problems are difficult to diagnose and may not be found during your first visit. Be sure to follow up with your primary care doctor and review any labs/imaging that was performed with them. If you do not have a primary care doctor, you may contact the one listed on your discharge paperwork or you may also call the [facility] Appointment Desk at [phone] to schedule an appointment with one.     All medications may potentially have side effects and it is impossible to predict which medications may give you side effects. If you feel that you are having a negative effect of any medication you should immediately stop taking them and seek medical attention.    Return to the ER with any questions/concerns, new/concerning symptoms, worsening or failure to improve. Do not drive or make any important decisions for 24 hours if you have received any pain medications, sedatives or mood altering drugs during your ER visit.

## 2023-04-07 NOTE — ED PROVIDER NOTES
Encounter Date: 2023    SCRIBE #1 NOTE: I, Tip Nixon, am scribing for, and in the presence of,  LARRY Morales. I have scribed the following portions of the note - Other sections scribed: HPI, ROS.     History     Chief Complaint   Patient presents with    Otalgia     Patient reports ear pain, sore throat and chills for past week, daughter being treated fir strep     CC: Sore throat    HPI: Ghada Mckeon is a 27 y.o. female who presents to the ED for evaluation of sore throat onset a week ago. She complains of associated ear pain and chills. She reports pain is worse when she swallows. She denies taking any medications for her symptoms. She denies difficulty swallowing, SOB, or other associated symptoms. She reports one of her daughters is currently being treated for strep throat. She denies having any known allergies.     The history is provided by the patient. No  was used.   Review of patient's allergies indicates:  No Known Allergies  Past Medical History:   Diagnosis Date    Depression     Hypertension      Past Surgical History:   Procedure Laterality Date     SECTION       Family History   Problem Relation Age of Onset    Breast cancer Neg Hx     Colon cancer Neg Hx     Ovarian cancer Neg Hx      Social History     Tobacco Use    Smoking status: Some Days    Smokeless tobacco: Current   Substance Use Topics    Alcohol use: No    Drug use: No     Review of Systems   Constitutional:  Positive for chills. Negative for fever.   HENT:  Positive for ear pain and sore throat. Negative for trouble swallowing.    Eyes:  Negative for visual disturbance.   Respiratory:  Negative for shortness of breath.    Cardiovascular:  Negative for chest pain.   Gastrointestinal:  Negative for abdominal pain.   Genitourinary:  Negative for difficulty urinating.   Musculoskeletal:  Negative for back pain.   Skin:  Negative for rash.   Neurological:  Negative for headaches.   All other systems  reviewed and are negative.    Physical Exam     Initial Vitals [04/07/23 1647]   BP Pulse Resp Temp SpO2   (!) 153/85 (!) 117 18 99.9 °F (37.7 °C) 96 %      MAP       --         Physical Exam    Nursing note and vitals reviewed.  Constitutional: Vital signs are normal. She appears well-developed and well-nourished. She is cooperative. She does not appear ill. No distress.   HENT:   Head: Normocephalic and atraumatic.   Right Ear: Hearing and external ear normal.   Left Ear: Hearing and external ear normal.   Nose: Nose normal.   Mouth/Throat: Uvula is midline. Mucous membranes are not dry. No uvula swelling. Oropharyngeal exudate, posterior oropharyngeal edema and posterior oropharyngeal erythema present. No tonsillar abscesses.   Bilateral tonsils swollen, erythematous, and with white exudates.  Patient is able to swallow and is managing secretions appropriately.  No stridor.  No airway compromise.  No tongue or lip swelling.   Eyes: Conjunctivae and EOM are normal.   Neck: Phonation normal.   Normal range of motion.  Cardiovascular:  Normal rate and regular rhythm.           No murmur heard.  Pulmonary/Chest: Effort normal. No respiratory distress.   Abdominal: Abdomen is soft. She exhibits no distension. There is no abdominal tenderness.   Musculoskeletal:      Cervical back: Normal range of motion.     Neurological: She is alert and oriented to person, place, and time. GCS eye subscore is 4. GCS verbal subscore is 5. GCS motor subscore is 6.   Skin: Skin is warm. Capillary refill takes less than 2 seconds.       ED Course   Procedures  Labs Reviewed   POCT STREP A MOLECULAR - Abnormal; Notable for the following components:       Result Value    Molecular Strep A, POC Positive (*)     All other components within normal limits   POCT URINE PREGNANCY          Imaging Results    None          Medications   acetaminophen tablet 650 mg (650 mg Oral Given 4/7/23 1702)   penicillin v potassium tablet 500 mg (500 mg Oral  Given 4/7/23 3666)     Medical Decision Making:   History:   Old Medical Records: I decided to obtain old medical records.  Initial Assessment:   19-year-old female presenting to the emergency department with a chief complaint of sore throat  Differential Diagnosis:   Differential diagnosis includes but is not limited to respiratory infections including strep pharyngitis, infectious mononucleosis, other viral pharyngitis, COVID, flu, bronchitis, rhinosinusitis, or pneumonia, or noninfectious processes such as asthma, COPD or seasonal allergies.   Clinical Tests:   Lab Tests: Ordered and Reviewed       <> Summary of Lab: Strep positive.  UPT negative.  ED Management:  Patient presenting to the emergency department with a chief complaint of sore throat and fever.  On physical exam, the patient is uncomfortable appearing but is otherwise in no acute distress.  She was tachycardic and borderline febrile.  No airway compromise, handling secretions appropriately, able to swallow without difficulty.  Bilateral tonsils were erythematous, swollen, and had lacy white exudate.  Tested positive for strep.  This presentation is consistent with streptococcal pharyngitis.  The patient was given Tylenol in the emergency department with improvement of fever and tachycardia.  Patient noted some improvement in pain after this treatment.  First dose of oral antibiotics was provided in the emergency department.  The patient will be discharged home with a 10 day course of Penicillin-Vk, throat spray, and throat lozenges for both antimicrobial coverage and symptomatic relief.  The patient voiced their understanding that they need to take the antibiotic medications until all of the pills are gone, even if symptoms improve or resolve.    Considered but doubt retropharyngeal abscess, peritonsillar abscess, or any other acute infectious process requiring further emergent workup or intervention.    Return precautions were discussed, all  patient questions were answered, and the patient was agreeable to the plan of care.  She was discharged home in stable condition and will follow up with her primary care provider or return to the emergency department if her symptoms worsen or do not improve.         Scribe Attestation:   Scribe #1: I performed the above scribed service and the documentation accurately describes the services I performed. I attest to the accuracy of the note.                 Scribe attestation: I, Martínez Webber PA-C, personally performed the services described in this documentation.  All medical record entries made by the scribe were at my direction and in my presence.  I have reviewed the chart and agree that the record reflects my personal performance and is accurate and complete.    Clinical Impression:   Final diagnoses:  [J02.0] Strep pharyngitis (Primary)        ED Disposition Condition    Discharge Stable          ED Prescriptions       Medication Sig Dispense Start Date End Date Auth. Provider    phenoL (CHLORASEPTIC THROAT SPRAY) 1.4 % SprA by Mucous Membrane route every 2 (two) hours as needed (Sore throat). 177 mL 4/7/2023 -- Martínez Webber PA-C    benzocaine-menthoL 6-10 mg lozenge Take 1 lozenge by mouth every 2 (two) hours as needed. 36 tablet 4/7/2023 -- Martínez Webber PA-C    ibuprofen (ADVIL,MOTRIN) 600 MG tablet Take 1 tablet (600 mg total) by mouth every 6 (six) hours as needed for Pain. 20 tablet 4/7/2023 -- Martínez Webber PA-C    acetaminophen (TYLENOL) 500 MG tablet Take 1 tablet (500 mg total) by mouth every 6 (six) hours as needed. 28 tablet 4/7/2023 4/14/2023 Martínez Webber PA-C    penicillin v potassium (VEETID) 500 MG tablet Take 1 tablet (500 mg total) by mouth 3 (three) times daily. for 10 days 30 tablet 4/7/2023 4/17/2023 Martínez Webber PA-C          Follow-up Information       Follow up With Specialties Details Why Contact Info    Neymar Griffith MD Obstetrics, Obstetrics and  Gynecology Schedule an appointment as soon as possible for a visit  As needed, If symptoms worsen [address] [facility] [address]  [address]  [facility]  [place] [address]  [phone]      [facility] Emergency Dept Emergency Medicine Go to  If symptoms worsen [address]  [facility] [id number] [phone]             Martínez Webber PA-C  04/07/23 6690

## 2023-04-07 NOTE — Clinical Note
"Ghada Jefferyyoung Mckeon was seen and treated in our emergency department on 4/7/2023.  She may return to work on 04/10/2023.       If you have any questions or concerns, please don't hesitate to call.      Martínez Webber PA-C"

## 2023-04-07 NOTE — FIRST PROVIDER EVALUATION
"Medical screening examination initiated.  I have conducted a focused provider triage encounter, findings are as follows:    Brief history of present illness:  Patient presents to the ED with complaints of Sore throat, chills, bilateral ear pain, and body aches for several days.  Daughter recently diagnosed with Strep    Vitals:    04/07/23 1647   BP: (!) 153/85   BP Location: Right arm   Patient Position: Sitting   Pulse: (!) 117   Resp: 18   Temp: 99.9 °F (37.7 °C)   TempSrc: Oral   SpO2: 96%   Weight: 119.7 kg (264 lb)   Height: 5' 7" (1.702 m)       Pertinent physical exam:  NAD    Brief workup plan:  UPT, Strep, tylenol    Preliminary workup initiated; this workup will be continued and followed by the physician or advanced practice provider that is assigned to the patient when roomed.  "

## 2023-05-08 ENCOUNTER — HOSPITAL ENCOUNTER (EMERGENCY)
Facility: HOSPITAL | Age: 28
Discharge: HOME | End: 2023-05-08
Attending: EMERGENCY MEDICINE
Payer: MEDICAID

## 2023-05-08 VITALS
OXYGEN SATURATION: 98 % | TEMPERATURE: 99 F | HEART RATE: 106 BPM | DIASTOLIC BLOOD PRESSURE: 91 MMHG | RESPIRATION RATE: 20 BRPM | WEIGHT: 256 LBS | SYSTOLIC BLOOD PRESSURE: 171 MMHG | HEIGHT: 67 IN | BODY MASS INDEX: 40.18 KG/M2

## 2023-05-08 DIAGNOSIS — J02.9 VIRAL PHARYNGITIS: Primary | ICD-10-CM

## 2023-05-08 LAB
B-HCG UR QL: NEGATIVE
CTP QC/QA: YES
MOLECULAR STREP A: NEGATIVE
POC MOLECULAR INFLUENZA A AGN: NEGATIVE
POC MOLECULAR INFLUENZA B AGN: NEGATIVE
SARS-COV-2 RDRP RESP QL NAA+PROBE: NEGATIVE

## 2023-05-08 PROCEDURE — 87651 STREP A DNA AMP PROBE: CPT

## 2023-05-08 PROCEDURE — 87502 INFLUENZA DNA AMP PROBE: CPT

## 2023-05-08 PROCEDURE — 81025 URINE PREGNANCY TEST: CPT | Performed by: EMERGENCY MEDICINE

## 2023-05-08 PROCEDURE — 99284 EMERGENCY DEPT VISIT MOD MDM: CPT

## 2023-05-08 PROCEDURE — 25000003 PHARM REV CODE 250

## 2023-05-08 RX ORDER — PHENOL 1.4 %
AEROSOL, SPRAY (ML) MUCOUS MEMBRANE
Qty: 177 ML | Refills: 0 | Status: SHIPPED | OUTPATIENT
Start: 2023-05-08 | End: 2023-08-17

## 2023-05-08 RX ORDER — FLUTICASONE PROPIONATE 50 MCG
2 SPRAY, SUSPENSION (ML) NASAL DAILY
Qty: 15.8 ML | Refills: 0 | Status: SHIPPED | OUTPATIENT
Start: 2023-05-08 | End: 2023-06-07

## 2023-05-08 RX ORDER — CETIRIZINE HYDROCHLORIDE 10 MG/1
10 TABLET ORAL DAILY
Qty: 30 TABLET | Refills: 0 | Status: SHIPPED | OUTPATIENT
Start: 2023-05-08 | End: 2023-08-17

## 2023-05-08 RX ORDER — IBUPROFEN 600 MG/1
600 TABLET ORAL EVERY 6 HOURS PRN
Qty: 20 TABLET | Refills: 0 | Status: SHIPPED | OUTPATIENT
Start: 2023-05-08 | End: 2023-08-17

## 2023-05-08 RX ORDER — IBUPROFEN 400 MG/1
800 TABLET ORAL
Status: COMPLETED | OUTPATIENT
Start: 2023-05-08 | End: 2023-05-08

## 2023-05-08 RX ORDER — ACETAMINOPHEN 500 MG
1000 TABLET ORAL EVERY 6 HOURS PRN
Qty: 56 TABLET | Refills: 0 | Status: SHIPPED | OUTPATIENT
Start: 2023-05-08 | End: 2023-05-15

## 2023-05-08 RX ADMIN — IBUPROFEN 800 MG: 400 TABLET ORAL at 04:05

## 2023-05-08 NOTE — DISCHARGE INSTRUCTIONS

## 2023-05-08 NOTE — Clinical Note
"Ghada Jefferyyoung Mckeon was seen and treated in our emergency department on 5/8/2023.  She may return to work on 05/11/2023.       If you have any questions or concerns, please don't hesitate to call.      Martínez Webber PA-C"

## 2023-05-08 NOTE — ED PROVIDER NOTES
"Encounter Date: 2023    SCRIBE #1 NOTE: I, Andrea Feliciano, am scribing for, and in the presence of,  Martínez Webber PA-C.     History     Chief Complaint   Patient presents with    Sore Throat     Pt to ER with c/o sore throat with painfull swallowing since waking up this morning      28 y/o female with HTN presents to the ED with sore throat and difficulty swallowing secondary to pain since last night. She describes her difficulty swallowing as R sided neck discomfort, saying it "feels like a knot when I swallow". She also endorses R ear pain and intermittent chills. No attempted treatment. No exacerbating or alleviating factors. She denies rhinorrhea, eye pain, eye discharge, ear discharge, cough, shortness of breath, chest pain, nausea, vomiting, diarrhea, or any other associated symptoms.     The history is provided by the patient. No  was used.   Review of patient's allergies indicates:  No Known Allergies  Past Medical History:   Diagnosis Date    Depression     Hypertension      Past Surgical History:   Procedure Laterality Date     SECTION       Family History   Problem Relation Age of Onset    Breast cancer Neg Hx     Colon cancer Neg Hx     Ovarian cancer Neg Hx      Social History     Tobacco Use    Smoking status: Some Days    Smokeless tobacco: Current   Substance Use Topics    Alcohol use: No    Drug use: No     Review of Systems   Constitutional:  Negative for diaphoresis, fatigue and unexpected weight change.   HENT:  Positive for ear pain (R), sore throat and trouble swallowing. Negative for ear discharge and sinus pain.    Eyes:  Negative for pain, discharge, redness and visual disturbance.   Respiratory:  Negative for cough, chest tightness, shortness of breath and wheezing.    Cardiovascular:  Negative for chest pain and palpitations.   Gastrointestinal:  Negative for abdominal pain, blood in stool, diarrhea, nausea and vomiting.   Endocrine: Negative for polydipsia, " polyphagia and polyuria.   Genitourinary:  Negative for dysuria, frequency and urgency.   Musculoskeletal:  Negative for arthralgias, back pain and myalgias.   Skin:  Negative for rash.   Allergic/Immunologic: Negative for environmental allergies.   Neurological:  Negative for dizziness, syncope and headaches.   Psychiatric/Behavioral:  Negative for suicidal ideas.      Physical Exam     Initial Vitals [05/08/23 1522]   BP Pulse Resp Temp SpO2   (!) 171/91 106 20 99.3 °F (37.4 °C) 98 %      MAP       --         Physical Exam    Nursing note and vitals reviewed.  Constitutional: She appears well-developed and well-nourished. She is not diaphoretic. She is cooperative. She does not appear ill. No distress.   HENT:   Head: Normocephalic and atraumatic.   Right Ear: Hearing, tympanic membrane, external ear and ear canal normal. Tympanic membrane is not injected and not erythematous. No middle ear effusion.   Left Ear: Hearing, tympanic membrane, external ear and ear canal normal. Tympanic membrane is not injected and not erythematous.  No middle ear effusion.   Nose: Rhinorrhea present. No mucosal edema or sinus tenderness. Right sinus exhibits no maxillary sinus tenderness and no frontal sinus tenderness. Left sinus exhibits no maxillary sinus tenderness and no frontal sinus tenderness.   Mouth/Throat: Uvula is midline and mucous membranes are normal. Mucous membranes are not dry. Posterior oropharyngeal edema and posterior oropharyngeal erythema present. No oropharyngeal exudate or tonsillar abscesses.   Bilateral tonsils are edematous but are minimally erythematous.  No exudates.  Patient is managing her secretions appropriately.  Airway is patent, no stridor.   Eyes: Conjunctivae, EOM and lids are normal. Pupils are equal, round, and reactive to light. Right conjunctiva is not injected. Left conjunctiva is not injected.   Neck: Neck supple.    Full passive range of motion without pain.     Cardiovascular:  Normal  rate, regular rhythm, S1 normal, S2 normal, normal heart sounds and normal pulses.     Exam reveals no friction rub.       No murmur heard.  Pulses:       Radial pulses are 2+ on the right side and 2+ on the left side.        Dorsalis pedis pulses are 2+ on the right side and 2+ on the left side.   Heart rate 94 beats per minute on my exam.   Pulmonary/Chest: Effort normal and breath sounds normal. No accessory muscle usage. No tachypnea. No respiratory distress.   Respirations even and unlabored.  No adventitious sounds of breathing throughout anterior and posterior lung fields.   Abdominal: Abdomen is soft and flat. She exhibits no distension. There is no abdominal tenderness.   Musculoskeletal:      Cervical back: Full passive range of motion without pain and neck supple. No edema or rigidity. No muscular tenderness. Normal range of motion.     Neurological: She is alert.   Skin: Skin is warm and dry. Capillary refill takes less than 2 seconds. No abrasion, no lesion and no rash noted.       ED Course   Procedures  Labs Reviewed   POCT INFLUENZA A/B MOLECULAR   SARS-COV-2 RDRP GENE   POCT STREP A MOLECULAR   POCT URINE PREGNANCY          Imaging Results    None          Medications   ibuprofen tablet 800 mg (800 mg Oral Given 5/8/23 9688)     Medical Decision Making:   Initial Assessment:   27-year-old female presenting to the emergency department with a chief complaint of sore throat for the last day.  Associated symptoms including subjective fever and pain with swallowing.  On physical exam, patient was clinically well-appearing and in no acute distress.  Bilateral tonsils were swollen and minimally erythematous but without any exudates.  Afebrile. Tachycardic in triage but not on my exam.  Cardiac and lung exams within normal limits.  Differential Diagnosis:   Differential diagnosis includes but is not limited to respiratory infections including viral pharyngitis, strep pharyngitis, COVID, flu, bronchitis,  rhinosinusitis, or pneumonia, or noninfectious processes such as asthma, COPD or seasonal allergies.   Clinical Tests:   Lab Tests: Ordered and Reviewed       <> Summary of Lab: COVID, flu, strep negative.  UPT negative.  ED Management:  Patient presenting to the emergency department with a chief complaint of sore throat.  History and physical exam findings as above.  Negative for strep, COVID, and flu.  Presentation is consistent with viral pharyngitis.  I discussed with the patient that steroids and antibiotics would not help to improve her condition and discussed supportive care with the patient.  She voiced her understanding.  One dose of Motrin in the emergency department.  Motrin, Tylenol, throat lozenges, throat spray all electronically prescribed and sent to the patient's preferred pharmacy.    I considered but doubt any further acute infectious or inflammatory process such as retropharyngeal abscess, peritonsillar abscess, deep space neck infection, that would require any further emergent workup or evaluation at this time.  No further laboratory or imaging studies were obtained.    Return precautions were discussed, all patient questions were answered, and the patient was agreeable to the plan of care.  She was discharged home in stable condition and will follow up with her primary care provider or return to the emergency department if her symptoms worsen or do not improve.         Scribe Attestation:   Scribe #1: I performed the above scribed service and the documentation accurately describes the services I performed. I attest to the accuracy of the note.            I, Martínez Webber PA-C, personally performed the services described in this documentation.  All medical record entries made by the scribe were at my direction and in my presence.  I have reviewed the chart and agree that the record reflects my personal performance and is accurate and complete.       Clinical Impression:   Final  diagnoses:  [J02.9] Viral pharyngitis (Primary)        ED Disposition Condition    Discharge Stable          ED Prescriptions       Medication Sig Dispense Start Date End Date Auth. Provider    fluticasone propionate (FLONASE) 50 mcg/actuation nasal spray 2 sprays (100 mcg total) by Each Nostril route once daily. 15.8 mL 5/8/2023 6/7/2023 Martínez Webber PA-C    cetirizine (ZYRTEC) 10 MG tablet Take 1 tablet (10 mg total) by mouth once daily. 30 tablet 5/8/2023 5/7/2024 Martínez Webber PA-C    benzocaine-menthoL 6-10 mg lozenge Take 1 lozenge by mouth every 2 (two) hours as needed. 36 tablet 5/8/2023 -- Martínez Webber PA-C    ibuprofen (ADVIL,MOTRIN) 600 MG tablet Take 1 tablet (600 mg total) by mouth every 6 (six) hours as needed for Pain. 20 tablet 5/8/2023 -- Martínez Webber PA-C    acetaminophen (TYLENOL) 500 MG tablet Take 2 tablets (1,000 mg total) by mouth every 6 (six) hours as needed for Pain. 56 tablet 5/8/2023 5/15/2023 Martínez Webber PA-C    phenoL (CHLORASEPTIC THROAT SPRAY) 1.4 % SprA by Mucous Membrane route every 2 (two) hours as needed (Sore throat). 177 mL 5/8/2023 -- Martínez Webber PA-C          Follow-up Information       Follow up With Specialties Details Why Contact Info    Neymar Griffith MD Obstetrics, Obstetrics and Gynecology Schedule an appointment as soon as possible for a visit  If symptoms worsen, As needed [address] [facility] [address]  [address]  [facility]  [place] [address]  [phone]      [facility] - Emergency Dept Emergency Medicine Go to  If symptoms worsen [address]  [facility] [id number] [phone]             Martínez Webber PA-C  05/08/23 3046

## 2023-05-10 ENCOUNTER — HOSPITAL ENCOUNTER (EMERGENCY)
Facility: HOSPITAL | Age: 28
Discharge: HOME | End: 2023-05-10
Attending: EMERGENCY MEDICINE | Admitting: EMERGENCY MEDICINE
Payer: MEDICAID

## 2023-05-10 VITALS
HEIGHT: 67 IN | RESPIRATION RATE: 18 BRPM | BODY MASS INDEX: 40.18 KG/M2 | WEIGHT: 256 LBS | DIASTOLIC BLOOD PRESSURE: 82 MMHG | OXYGEN SATURATION: 95 % | SYSTOLIC BLOOD PRESSURE: 162 MMHG | TEMPERATURE: 99 F | HEART RATE: 104 BPM

## 2023-05-10 DIAGNOSIS — J36 PERITONSILLAR ABSCESS: Primary | ICD-10-CM

## 2023-05-10 DIAGNOSIS — R00.0 TACHYCARDIA: ICD-10-CM

## 2023-05-10 DIAGNOSIS — J02.0 STREP PHARYNGITIS: ICD-10-CM

## 2023-05-10 LAB
ALBUMIN SERPL BCP-MCNC: 3.2 G/DL (ref 3.5–5.2)
ALP SERPL-CCNC: 91 U/L (ref 55–135)
ALT SERPL W/O P-5'-P-CCNC: 26 U/L (ref 10–44)
ANION GAP SERPL CALC-SCNC: 13 MMOL/L (ref 8–16)
AST SERPL-CCNC: 14 U/L (ref 10–40)
B-HCG UR QL: NEGATIVE
BASOPHILS # BLD AUTO: 0.04 K/UL (ref 0–0.2)
BASOPHILS NFR BLD: 0.2 % (ref 0–1.9)
BILIRUB SERPL-MCNC: 0.4 MG/DL (ref 0.1–1)
BUN SERPL-MCNC: 7 MG/DL (ref 6–20)
CALCIUM SERPL-MCNC: 9.6 MG/DL (ref 8.7–10.5)
CHLORIDE SERPL-SCNC: 104 MMOL/L (ref 95–110)
CO2 SERPL-SCNC: 21 MMOL/L (ref 23–29)
CREAT SERPL-MCNC: 0.9 MG/DL (ref 0.5–1.4)
CTP QC/QA: YES
CTP QC/QA: YES
DIFFERENTIAL METHOD: ABNORMAL
EOSINOPHIL # BLD AUTO: 0 K/UL (ref 0–0.5)
EOSINOPHIL NFR BLD: 0 % (ref 0–8)
ERYTHROCYTE [DISTWIDTH] IN BLOOD BY AUTOMATED COUNT: 14.6 % (ref 11.5–14.5)
EST. GFR  (NO RACE VARIABLE): >60 ML/MIN/1.73 M^2
GLUCOSE SERPL-MCNC: 132 MG/DL (ref 70–110)
HCT VFR BLD AUTO: 38.4 % (ref 37–48.5)
HGB BLD-MCNC: 12.6 G/DL (ref 12–16)
IMM GRANULOCYTES # BLD AUTO: 0.09 K/UL (ref 0–0.04)
IMM GRANULOCYTES NFR BLD AUTO: 0.5 % (ref 0–0.5)
LACTATE SERPL-SCNC: 0.6 MMOL/L (ref 0.5–2.2)
LACTATE SERPL-SCNC: 2.4 MMOL/L (ref 0.5–2.2)
LYMPHOCYTES # BLD AUTO: 1.5 K/UL (ref 1–4.8)
LYMPHOCYTES NFR BLD: 8.2 % (ref 18–48)
MCH RBC QN AUTO: 26.8 PG (ref 27–31)
MCHC RBC AUTO-ENTMCNC: 32.8 G/DL (ref 32–36)
MCV RBC AUTO: 82 FL (ref 82–98)
MOLECULAR STREP A: POSITIVE
MONOCYTES # BLD AUTO: 0.8 K/UL (ref 0.3–1)
MONOCYTES NFR BLD: 4.3 % (ref 4–15)
NEUTROPHILS # BLD AUTO: 16.2 K/UL (ref 1.8–7.7)
NEUTROPHILS NFR BLD: 86.8 % (ref 38–73)
NRBC BLD-RTO: 0 /100 WBC
PLATELET # BLD AUTO: 298 K/UL (ref 150–450)
PMV BLD AUTO: 10.7 FL (ref 9.2–12.9)
POTASSIUM SERPL-SCNC: 3.2 MMOL/L (ref 3.5–5.1)
PROT SERPL-MCNC: 8.4 G/DL (ref 6–8.4)
RBC # BLD AUTO: 4.7 M/UL (ref 4–5.4)
SODIUM SERPL-SCNC: 138 MMOL/L (ref 136–145)
WBC # BLD AUTO: 18.61 K/UL (ref 3.9–12.7)

## 2023-05-10 PROCEDURE — 93010 EKG 12-LEAD: ICD-10-PCS | Mod: ,,, | Performed by: INTERNAL MEDICINE

## 2023-05-10 PROCEDURE — 87205 SMEAR GRAM STAIN: CPT | Performed by: EMERGENCY MEDICINE

## 2023-05-10 PROCEDURE — 96361 HYDRATE IV INFUSION ADD-ON: CPT

## 2023-05-10 PROCEDURE — 87147 CULTURE TYPE IMMUNOLOGIC: CPT | Mod: 59 | Performed by: EMERGENCY MEDICINE

## 2023-05-10 PROCEDURE — 93010 ELECTROCARDIOGRAM REPORT: CPT | Mod: ,,, | Performed by: INTERNAL MEDICINE

## 2023-05-10 PROCEDURE — 42700 PR INC/DRAIN PERITONSIL ABSCESS: ICD-10-PCS | Mod: ,,, | Performed by: OTOLARYNGOLOGY

## 2023-05-10 PROCEDURE — 87070 CULTURE OTHR SPECIMN AEROBIC: CPT | Performed by: EMERGENCY MEDICINE

## 2023-05-10 PROCEDURE — 87075 CULTR BACTERIA EXCEPT BLOOD: CPT | Performed by: EMERGENCY MEDICINE

## 2023-05-10 PROCEDURE — 63600175 PHARM REV CODE 636 W HCPCS

## 2023-05-10 PROCEDURE — 99203 PR OFFICE/OUTPT VISIT, NEW, LEVL III, 30-44 MIN: ICD-10-PCS | Mod: 25,,, | Performed by: OTOLARYNGOLOGY

## 2023-05-10 PROCEDURE — 99285 EMERGENCY DEPT VISIT HI MDM: CPT | Mod: 25

## 2023-05-10 PROCEDURE — 87651 STREP A DNA AMP PROBE: CPT

## 2023-05-10 PROCEDURE — 42700 I&D ABSCESS PERITONSILLAR: CPT | Mod: ,,, | Performed by: OTOLARYNGOLOGY

## 2023-05-10 PROCEDURE — 25000003 PHARM REV CODE 250

## 2023-05-10 PROCEDURE — 87040 BLOOD CULTURE FOR BACTERIA: CPT | Mod: 59

## 2023-05-10 PROCEDURE — 85025 COMPLETE CBC W/AUTO DIFF WBC: CPT

## 2023-05-10 PROCEDURE — 96365 THER/PROPH/DIAG IV INF INIT: CPT | Mod: 59

## 2023-05-10 PROCEDURE — 25500020 PHARM REV CODE 255

## 2023-05-10 PROCEDURE — 81025 URINE PREGNANCY TEST: CPT

## 2023-05-10 PROCEDURE — 93005 ELECTROCARDIOGRAM TRACING: CPT

## 2023-05-10 PROCEDURE — 83605 ASSAY OF LACTIC ACID: CPT

## 2023-05-10 PROCEDURE — 80053 COMPREHEN METABOLIC PANEL: CPT

## 2023-05-10 PROCEDURE — 99203 OFFICE O/P NEW LOW 30 MIN: CPT | Mod: 25,,, | Performed by: OTOLARYNGOLOGY

## 2023-05-10 PROCEDURE — 96375 TX/PRO/DX INJ NEW DRUG ADDON: CPT

## 2023-05-10 PROCEDURE — 96367 TX/PROPH/DG ADDL SEQ IV INF: CPT

## 2023-05-10 RX ORDER — HYDROCODONE BITARTRATE AND ACETAMINOPHEN 5; 325 MG/1; MG/1
1 TABLET ORAL EVERY 6 HOURS PRN
Qty: 12 TABLET | Refills: 0 | Status: SHIPPED | OUTPATIENT
Start: 2023-05-10 | End: 2023-08-17

## 2023-05-10 RX ORDER — DEXAMETHASONE SODIUM PHOSPHATE 4 MG/ML
12 INJECTION, SOLUTION INTRA-ARTICULAR; INTRALESIONAL; INTRAMUSCULAR; INTRAVENOUS; SOFT TISSUE
Status: COMPLETED | OUTPATIENT
Start: 2023-05-10 | End: 2023-05-10

## 2023-05-10 RX ORDER — MORPHINE SULFATE 4 MG/ML
8 INJECTION, SOLUTION INTRAMUSCULAR; INTRAVENOUS
Status: COMPLETED | OUTPATIENT
Start: 2023-05-10 | End: 2023-05-10

## 2023-05-10 RX ORDER — DEXAMETHASONE 4 MG/1
12 TABLET ORAL EVERY 12 HOURS
Qty: 6 TABLET | Refills: 0 | Status: SHIPPED | OUTPATIENT
Start: 2023-05-11 | End: 2023-05-12

## 2023-05-10 RX ORDER — CEFTRIAXONE 2 G/50ML
2 INJECTION, SOLUTION INTRAVENOUS
Status: COMPLETED | OUTPATIENT
Start: 2023-05-10 | End: 2023-05-10

## 2023-05-10 RX ORDER — KETOROLAC TROMETHAMINE 30 MG/ML
15 INJECTION, SOLUTION INTRAMUSCULAR; INTRAVENOUS
Status: COMPLETED | OUTPATIENT
Start: 2023-05-10 | End: 2023-05-10

## 2023-05-10 RX ORDER — AMOXICILLIN AND CLAVULANATE POTASSIUM 875; 125 MG/1; MG/1
1 TABLET, FILM COATED ORAL 2 TIMES DAILY
Qty: 14 TABLET | Refills: 0 | Status: SHIPPED | OUTPATIENT
Start: 2023-05-10 | End: 2023-05-10 | Stop reason: SDUPTHER

## 2023-05-10 RX ORDER — ONDANSETRON 4 MG/1
4 TABLET, ORALLY DISINTEGRATING ORAL EVERY 8 HOURS PRN
Qty: 20 TABLET | Refills: 0 | Status: SHIPPED | OUTPATIENT
Start: 2023-05-10 | End: 2023-05-10 | Stop reason: SDUPTHER

## 2023-05-10 RX ORDER — ONDANSETRON 4 MG/1
4 TABLET, ORALLY DISINTEGRATING ORAL EVERY 8 HOURS PRN
Qty: 20 TABLET | Refills: 0 | Status: SHIPPED | OUTPATIENT
Start: 2023-05-10 | End: 2023-08-17

## 2023-05-10 RX ORDER — IBUPROFEN 400 MG/1
600 TABLET ORAL EVERY 6 HOURS PRN
Qty: 20 TABLET | Refills: 0 | Status: SHIPPED | OUTPATIENT
Start: 2023-05-10 | End: 2023-05-10 | Stop reason: SDUPTHER

## 2023-05-10 RX ORDER — CLINDAMYCIN PHOSPHATE 600 MG/50ML
600 INJECTION, SOLUTION INTRAVENOUS
Status: COMPLETED | OUTPATIENT
Start: 2023-05-10 | End: 2023-05-10

## 2023-05-10 RX ORDER — IBUPROFEN 600 MG/1
600 TABLET ORAL
Status: DISCONTINUED | OUTPATIENT
Start: 2023-05-10 | End: 2023-05-10

## 2023-05-10 RX ORDER — DEXAMETHASONE 4 MG/1
12 TABLET ORAL EVERY 12 HOURS
Qty: 6 TABLET | Refills: 0 | Status: SHIPPED | OUTPATIENT
Start: 2023-05-11 | End: 2023-05-10 | Stop reason: SDUPTHER

## 2023-05-10 RX ORDER — AMOXICILLIN AND CLAVULANATE POTASSIUM 875; 125 MG/1; MG/1
1 TABLET, FILM COATED ORAL 2 TIMES DAILY
Qty: 14 TABLET | Refills: 0 | Status: SHIPPED | OUTPATIENT
Start: 2023-05-10 | End: 2023-08-17

## 2023-05-10 RX ORDER — HYDROCODONE BITARTRATE AND ACETAMINOPHEN 5; 325 MG/1; MG/1
1 TABLET ORAL EVERY 6 HOURS PRN
Qty: 12 TABLET | Refills: 0 | Status: SHIPPED | OUTPATIENT
Start: 2023-05-10 | End: 2023-05-10 | Stop reason: SDUPTHER

## 2023-05-10 RX ORDER — IBUPROFEN 400 MG/1
600 TABLET ORAL EVERY 6 HOURS PRN
Qty: 20 TABLET | Refills: 0 | Status: SHIPPED | OUTPATIENT
Start: 2023-05-10 | End: 2023-08-17

## 2023-05-10 RX ORDER — ONDANSETRON 2 MG/ML
4 INJECTION INTRAMUSCULAR; INTRAVENOUS
Status: DISCONTINUED | OUTPATIENT
Start: 2023-05-10 | End: 2023-05-10 | Stop reason: HOSPADM

## 2023-05-10 RX ORDER — DEXAMETHASONE SODIUM PHOSPHATE 4 MG/ML
10 INJECTION, SOLUTION INTRA-ARTICULAR; INTRALESIONAL; INTRAMUSCULAR; INTRAVENOUS; SOFT TISSUE
Status: DISCONTINUED | OUTPATIENT
Start: 2023-05-10 | End: 2023-05-10

## 2023-05-10 RX ADMIN — KETOROLAC TROMETHAMINE 15 MG: 30 INJECTION, SOLUTION INTRAMUSCULAR; INTRAVENOUS at 11:05

## 2023-05-10 RX ADMIN — DEXAMETHASONE SODIUM PHOSPHATE 12 MG: 4 INJECTION INTRA-ARTICULAR; INTRALESIONAL; INTRAMUSCULAR; INTRAVENOUS; SOFT TISSUE at 03:05

## 2023-05-10 RX ADMIN — SODIUM CHLORIDE 1000 ML: 9 INJECTION, SOLUTION INTRAVENOUS at 11:05

## 2023-05-10 RX ADMIN — MORPHINE SULFATE 8 MG: 4 INJECTION, SOLUTION INTRAMUSCULAR; INTRAVENOUS at 12:05

## 2023-05-10 RX ADMIN — IOHEXOL 75 ML: 350 INJECTION, SOLUTION INTRAVENOUS at 12:05

## 2023-05-10 RX ADMIN — CLINDAMYCIN IN 5 PERCENT DEXTROSE 600 MG: 12 INJECTION, SOLUTION INTRAVENOUS at 02:05

## 2023-05-10 RX ADMIN — CEFTRIAXONE 2 G: 2 INJECTION, SOLUTION INTRAVENOUS at 02:05

## 2023-05-10 NOTE — CONSULTS
[facility] - [place] Emergency Dept  Otorhinolaryngology-Head & Neck Surgery  Consult Note    Patient Name: Ghada Mckeon  MRN: [id number]  Code Status: Prior  Admission Date: 5/10/2023  Hospital Length of Stay: 0 days  Attending Physician: Chester Taylor MD  Primary Care Provider: Neymar Griffith MD    Consults  Pta drained at bedside, patient tolerated well   Culture obtained  Dc with augmenitn if able to swallow  Counsled on potential recurrence - has some intratonsillar component but had large amount of pus aspirated and antibiotic naive , reasonable to do trial of outpatient therapy  Subjective:     Chief Complaint/Reason for Admission: throat pain     History of Present Illness: does not recall having had a history of abscess. Used to get tonsil infections when she was younger  Right otalgia.   Difficulty swallowing  Muffled voice   Symptoms started on 5-8 , viral pharyngitis suspected but patient had worsening of symptoms and proceeded to er today for eval     Medications:  Continuous Infusions:  Scheduled Meds:   ondansetron  4 mg Intravenous ED 1 Time     PRN Meds:     No current facility-administered medications on file prior to encounter.     Current Outpatient Medications on File Prior to Encounter   Medication Sig    acetaminophen (TYLENOL) 500 MG tablet Take 2 tablets (1,000 mg total) by mouth every 6 (six) hours as needed for Pain.    benzocaine-menthoL 6-10 mg lozenge Take 1 lozenge by mouth every 2 (two) hours as needed.    cetirizine (ZYRTEC) 10 MG tablet Take 1 tablet (10 mg total) by mouth once daily.    etonogestreL-ethinyl estradioL (NUVARING) 0.12-0.015 mg/24 hr vaginal ring Place 1 each vaginally every 21 days. Insert one (1) ring vaginally and leave in place for three (3) weeks, then remove for one (1) week.    fluticasone propionate (FLONASE) 50 mcg/actuation nasal spray 2 sprays (100 mcg total) by Each Nostril route once daily.    ibuprofen (ADVIL,MOTRIN) 600 MG tablet Take 1 tablet (600  mg total) by mouth every 6 (six) hours as needed for Pain.    melatonin 5 mg Cap Take 1 capsule (5 mg total) by mouth every evening.    phenoL (CHLORASEPTIC THROAT SPRAY) 1.4 % SprA by Mucous Membrane route every 2 (two) hours as needed (Sore throat).       Review of patient's allergies indicates:  No Known Allergies    Past Medical History:   Diagnosis Date    Depression     Hypertension      Past Surgical History:   Procedure Laterality Date     SECTION       Family History    None       Tobacco Use    Smoking status: Some Days    Smokeless tobacco: Current   Substance and Sexual Activity    Alcohol use: No    Drug use: No    Sexual activity: Yes     Partners: Male     Review of Systems   Constitutional:  Negative for fever.   HENT:  Positive for ear pain, trouble swallowing and voice change. Negative for ear discharge.    Respiratory:  Negative for shortness of breath.    Musculoskeletal:  Negative for neck stiffness.   Skin:  Negative for color change.   Neurological:  Negative for headaches.   Objective:     Vital Signs (Most Recent):  Temp: 99.1 °F (37.3 °C) (05/10/23 154)  Pulse: 90 (05/10/23 154)  Resp: 18 (05/10/23 154)  BP: (!) 187/93 (05/10/23 154)  SpO2: 98 % (05/10/23 154) Vital Signs (24h Range):  Temp:  [99.1 °F (37.3 °C)-101.8 °F (38.8 °C)] 99.1 °F (37.3 °C)  Pulse:  [] 90  Resp:  [18-20] 18  SpO2:  [97 %-98 %] 98 %  BP: (173-187)/() 187/93     Weight: 116.1 kg (256 lb)  Body mass index is 40.1 kg/m².    Date 05/10/23 0700 - 23 0659   Shift 7876-0687 2182-4584 3947-3543 24 Hour Total   INTAKE   IV Piggyback 1050 45.2  1095.2   Shift Total(mL/kg) 1050(9) 45.2(0.4)  1095.2(9.4)   OUTPUT   Shift Total(mL/kg)       Weight (kg) 116.1 116.1 116.1 116.1       Physical Exam  HENT:      Head: Normocephalic.      Mouth/Throat:        Comments: Modified mallampati 3-4  Neck:      Trachea: Trachea normal.      Comments: Muffled voice    Shotty lymphadenopathy   Pulmonary:       Effort: Pulmonary effort is normal.      Breath sounds: No stridor.   Neurological:      Mental Status: She is alert.     Incision and drainage of peritonsillar abscess.    Surgeon: Vikki Robles MD  Consent: Written and verbal consent obtained  Sedation: None  Anesthetic: Topical 4% lidocaine then infiltrate with local anesthetic of 1% lidocaine with 1:100,000 epinephrine.  Side:  Right     Specimens:  Scant purulent material from site of right peritonsillar abscess incision and drainage was obtained and sent for culture and sensitivity.    Indications:   Given the findings on examination today consistent with likely peritonsillar abscess fluid accumulation on the right side, incision and drainage of peritonsillar abscess is indicated. This is also in consideration of the >24 hour long trial of antibiotic therapy which the patient has received already for this condition, without significant improvement/response to therapy. I discussed with the patient the risks and benefits of peritonsillar abscess drainage, specifically the risk of hemorrhage/significant bleeding and aspiration/drainage of pus/blood into the airway which may produce respiratory distress. Other anticipated adverse effects of the treatment discussed included acute worsening of pain related to the procedure. The possibility of worsening or failure with need for additional procedural intervention including possible repeat incision and drainage was also discussed.     Findings:   There was a fluctuant and edematous asymmetric swelling at the superior right anterior tonsillar pillar/soft palate. This site was first aspirated with a small amount of intermixed purulent and sanguinous drainage obtained. This site was then incised and spread open releasing a small amount of similar quality drainage. Culture obtained of this drainage.   Approximately 6 cc of pus was drained.    Description of procedure:   Written and verbal consent was obtained after  describing the indication for this diagnostic procedure and potential risks. Topical anesthetic was applied in the oropharynx over the tonsil, soft palate, and peritonsillar tissue at planned site of incision and drainage. After waiting for adequate time to obtain topical anesthetic effect at this area, I then infiltrated 1% lidocaine with 1:100,000 epinephrine to the peritonsillar/soft palate tissue at planned site of incision and drainage with a small gauge needle. A larger 18-gauge spinal needle was then placed at the suspected site of abscess and positioning readjusted until aspiration confirmed position within the abscess with return of purulent material. Approximately 6 cc of pus was drained.  At this confirmed site based on aspiration, a scalpel was then used to incise the tissue and enter the abscess. The incision was extended approximately a centimeter inferiorly. A cotton-tip applicator was then used to probe and gently spread the cavity open for further drainage. A suction was then used to remove additional drainage from the cavity and clear the oropharynx and oral cavity of drainage and secretions. The patient tolerated all portions well with no complications.     Significant Labs:  SBC 18.61  Strep positive     Significant Diagnostics:  CT: I have reviewed all pertinent results/findings within the past 24 hours and my personal findings are:  right peritonsillar abscess with slight extension intraonsillar  Reviewed images with patient as well   Assessment/Plan:     Peritonsillar abscess    VTE Risk Mitigation (From admission, onward)      None        Pta drained at bedside, patient tolerated well   Culture obtained  Dc with christine if able to swallow  Counsled on potential recurrence - has some intratonsillar component but had large amount of pus aspirated and antibiotic naive , reasonable to do trial of outpatient therapy  F/u in 2 weeks   Thank you for your consult. I will sign off. Please contact us  if you have any additional questions.    Vikki Robles MD  Otorhinolaryngology-Head & Neck Surgery  [facility] - [place] - Emergency Dept

## 2023-05-10 NOTE — DISCHARGE INSTRUCTIONS

## 2023-05-10 NOTE — Clinical Note
"Ghada Jefferythelma" Mike was seen and treated in our emergency department on 5/10/2023.  She may return to work on 05/15/2023.       If you have any questions or concerns, please don't hesitate to call.      Margarita Anguiano RN    "

## 2023-05-10 NOTE — ED PROVIDER NOTES
Encounter Date: 5/10/2023       History     Chief Complaint   Patient presents with    Sore Throat     Pt with sore throat starting Monday. Seen here on Monday. Worsening symptoms. Fever here in ED       27-year-old female with a past medical history depression and hypertension presents to ED for sore throat.  Patient states this started on Monday.  Patient has tried Motrin with no relief.  Patient denies any sick contacts or recent trauma.  Patient states she was seen here on the  and was negative for COVID, flu, and strep and diagnosed with a viral pharyngitis.  Patient states it has been getting worse.  Patient complains of sweats, chills, postnasal drip, sore throat, right ear pain, drooling, and painful swallowing.  Patient denies fever, runny nose, congestion, ear drainage, cough, shortness breath, chest pain, abdominal pain nausea vomiting, diarrhea constipation, urinary symptoms, body aches, headaches, dizziness lightheadedness, and rashes.      Review of patient's allergies indicates:  No Known Allergies  Past Medical History:   Diagnosis Date    Depression     Hypertension      Past Surgical History:   Procedure Laterality Date     SECTION       Family History   Problem Relation Age of Onset    Breast cancer Neg Hx     Colon cancer Neg Hx     Ovarian cancer Neg Hx      Social History     Tobacco Use    Smoking status: Some Days    Smokeless tobacco: Current   Substance Use Topics    Alcohol use: No    Drug use: No     Review of Systems   Constitutional:  Positive for chills and diaphoresis. Negative for fever.   HENT:  Positive for drooling, ear pain, postnasal drip, sore throat, trouble swallowing and voice change. Negative for congestion, ear discharge, hearing loss and rhinorrhea.    Respiratory:  Negative for cough and shortness of breath.    Cardiovascular:  Negative for chest pain.   Gastrointestinal:  Negative for abdominal pain, constipation, diarrhea, nausea and vomiting.    Genitourinary:  Negative for decreased urine volume and difficulty urinating.   Musculoskeletal:  Negative for back pain, myalgias and neck pain.   Skin:  Negative for rash.   Neurological:  Negative for dizziness, weakness, light-headedness and headaches.     Physical Exam     Initial Vitals [05/10/23 1037]   BP Pulse Resp Temp SpO2   (!) 173/106 (!) 118 18 (!) 101.8 °F (38.8 °C) 97 %      MAP       --         Physical Exam    Nursing note and vitals reviewed.  Constitutional: She appears well-developed and well-nourished. She is not diaphoretic. She is active. She appears ill. No distress.   HENT:   Head: Normocephalic and atraumatic.   Right Ear: Hearing, tympanic membrane, external ear and ear canal normal.   Left Ear: Hearing, tympanic membrane, external ear and ear canal normal.   Nose: Nose normal.   Mouth/Throat: Uvula is midline and mucous membranes are normal. Posterior oropharyngeal edema (4+ bilateral tonsils), posterior oropharyngeal erythema and tonsillar abscesses (right side) present. No oropharyngeal exudate.   Muffled voice   Eyes: Conjunctivae, EOM and lids are normal. Pupils are equal, round, and reactive to light. Right eye exhibits no discharge. Left eye exhibits no discharge.   Neck: Neck supple.   Normal range of motion.   Full passive range of motion without pain.     Cardiovascular:  Regular rhythm.   Tachycardia present.         Pulmonary/Chest: Effort normal and breath sounds normal. No respiratory distress.   Abdominal: She exhibits no distension.   Musculoskeletal:         General: Normal range of motion.      Cervical back: Full passive range of motion without pain, normal range of motion and neck supple. No edema, erythema or rigidity. Muscular tenderness present. No spinous process tenderness. Normal range of motion.     Neurological: She is alert and oriented to person, place, and time. GCS eye subscore is 4. GCS verbal subscore is 5. GCS motor subscore is 6.   Skin: Skin is dry.  Capillary refill takes less than 2 seconds.       ED Course   Procedures  Labs Reviewed   CBC W/ AUTO DIFFERENTIAL - Abnormal; Notable for the following components:       Result Value    WBC 18.61 (*)     MCH 26.8 (*)     RDW 14.6 (*)     Gran # (ANC) 16.2 (*)     Immature Grans (Abs) 0.09 (*)     Gran % 86.8 (*)     Lymph % 8.2 (*)     All other components within normal limits   COMPREHENSIVE METABOLIC PANEL - Abnormal; Notable for the following components:    Potassium 3.2 (*)     CO2 21 (*)     Glucose 132 (*)     Albumin 3.2 (*)     All other components within normal limits   LACTIC ACID, PLASMA - Abnormal; Notable for the following components:    Lactate (Lactic Acid) 2.4 (*)     All other components within normal limits   POCT STREP A MOLECULAR - Abnormal; Notable for the following components:    Molecular Strep A, POC Positive (*)     All other components within normal limits   CULTURE, BLOOD   CULTURE, BLOOD   GRAM STAIN   CULTURE, ANAEROBIC   CULTURE, AEROBIC  (SPECIFY SOURCE)   LACTIC ACID, PLASMA   POCT URINE PREGNANCY     EKG Readings: (Independently Interpreted)   EKG showed normal sinus rhythm with a rate of 83 beats per minute.  VT interval 140 milliseconds.  QRS is 78 milliseconds.  QTC of 437 milliseconds.  Normal EKG.  No STEMI noted.  Signed by Dr. Romero.      Imaging Results               CT Soft Tissue Neck With Contrast (Final result)  Result time 05/10/23 13:40:56      Final result by Evans Zabala MD (05/10/23 13:40:56)                   Impression:      Findings suggestive of tonsillitis with right peritonsillar abscess measuring up to 2.6 cm in size.    Unremarkable appearance of the epiglottis.    Bilateral cervical chain lymphadenopathy, likely reactive.    Motion limited study.    Additional findings discussed in the body of the report.    This report was flagged in Epic as abnormal.      Electronically signed by: Evans Zabala MD  Date:    05/10/2023  Time:    13:40                Narrative:    EXAMINATION:  CT SOFT TISSUE NECK WITH CONTRAST    CLINICAL HISTORY:  Epiglottitis or tonsillitis suspected;Neck abscess, deep tissue;    TECHNIQUE:  CT images obtained throughout the region of the neck after the administration of 75 mL Omnipaque 350 intravenous contrast. Axial, sagittal and coronal reconstructions were performed.    COMPARISON:  None.    FINDINGS:  Exam quality is limited by motion and suboptimal bolus timing.    Enlargement and heterogeneous enhancement of the bilateral palatine tonsils.  Right peritonsillar fluid collection measures 1.7 x 1.4 x 2.6 cm (series 5, image 54 and series 604, image 71).  Mild mass effect on the airway, which remains grossly patent.    Prominent cervical chain lymph nodes, measuring up to 1.4 cm in short axis on the right (series 5, image 69).  No retropharyngeal collection.  Epiglottis is unremarkable.    Neck soft tissues are otherwise unremarkable.  The parotid, submandibular, and thyroid glands are within normal limits.    Major vessels of the neck appear patent. Limited intracranial evaluation is within normal limits.  Mild mucosal thickening in the paranasal sinuses.  No air-fluid levels.  Mastoid air cells are essentially clear.    Small pulmonary micronodule in the right lung apex, likely infectious/inflammatory in this age group (series 3, image 361).  No consolidation.    Bones demonstrate no aggressive osseous lesion.  Scattered dental caries and periodontal disease, most pronounced in the right mandibular molar teeth.                                       X-Ray Chest AP Portable (Final result)  Result time 05/10/23 13:00:54      Final result by Yobany Romero MD (05/10/23 13:00:54)                   Impression:      1. Interstitial findings are accentuated by shallow inspiratory effort and habitus however underlying edema is a consideration.  Correlation is needed.      Electronically signed by: Yobany Romero  MD  Date:    05/10/2023  Time:    13:00               Narrative:    EXAMINATION:  XR CHEST AP PORTABLE    CLINICAL HISTORY:  Sepsis;    TECHNIQUE:  Single frontal view of the chest was performed.    COMPARISON:  02/24/2019    FINDINGS:  The cardiomediastinal silhouette is not enlarged, magnified by technique..  There is no pleural effusion.  The trachea is midline.  The lungs are symmetrically expanded bilaterally with coarse interstitial attenuation, accentuated by shallow inspiratory effort and habitus..  No large focal consolidation seen.  There is no pneumothorax.  The osseous structures are unremarkable.                                       Medications   sodium chloride 0.9% bolus 1,000 mL 1,000 mL (0 mLs Intravenous Stopped 5/10/23 1239)   ketorolac injection 15 mg (15 mg Intravenous Given 5/10/23 1146)   morphine injection 8 mg (8 mg Intravenous Given 5/10/23 1235)   iohexoL (OMNIPAQUE 350) injection 75 mL (75 mLs Intravenous Given 5/10/23 1255)   cefTRIAXone 2 gram/50 mL IVPB 2 g (0 g Intravenous Stopped 5/10/23 1452)   clindamycin in D5W 600 mg/50 mL IVPB 600 mg (0 mg Intravenous Stopped 5/10/23 1554)   dexAMETHasone injection 12 mg (12 mg Intravenous Given 5/10/23 1536)     Medical Decision Making:   Initial Assessment:   27-year-old female with a past medical history depression and hypertension presents to ED for sore throat.  Patient's chart and medical history reviewed.  Differential Diagnosis:   COVID  Influenza  Strep pharyngitis  Viral pharyngitis  Rigoberto's angina  Retropharyngeal abscess  Tonsillar abscess  Clinical Tests:   Lab Tests: Ordered and Reviewed  Radiological Study: Reviewed and Ordered  Medical Tests: Ordered and Reviewed  ED Management:  Patient's vitals reviewed.  She is febrile, no respiratory distress, nontoxic-appearing in the ED. patient has tachycardia, 4+ bilateral tonsils with erythema, and neck tenderness to palpation.  No exudates appreciated.  No neck pain with range of  motion.  Patient had muffled voice on exam.  We will rule out RTA.  Patient started on fluids and given Toradol for pain.  CBC showed leukocytosis of 18.61.  Patient is strep positive today.  Blood cultures pending. Lactic acid slightly elevated to 2.4. Sepsis workup already in place; will get repeat lactic.  CMP showed hypokalemia of 3.2, otherwise unremarkable.  Patient still complaining of 10/10 pain.  Patient given a dose of morphine. EKG showed normal sinus rhythm with a rate of 83 beats per minute.  ME interval 140 milliseconds.  QRS is 78 milliseconds.  QTC of 437 milliseconds.  Normal EKG.  No STEMI noted.  Signed by Dr. Romero. Chest x-ray was unremarkable per my personal interpretation. Official chest x-ray interpretation showed interstitial findings are accentuated by shallow inspiratory effort and habitus however underlying edema is a consideration.  Repeat vitals show tachycardia and temperature now in normal range.  CT soft tissue neck showed findings suggestive of tonsillitis with right peritonsillar abscess measuring up to 2.6 cm in size. Unremarkable appearance of the epiglottis. Bilateral cervical chain lymphadenopathy, likely reactive.  Patient given Decadron, ceftriaxone, and clindamycin in the ED.  Patient will be sent home on clindamycin for her tonsillar abscess and strep.  Patient tolerated p.o. challenge.  Discussed this case with Dr. Taylor.  Will consult ENT.  Secure chatted and called Dr. Robles, waiting for her to reach back out.  Case handed over to Dr. Taylor at shift change pending consult from ENT and 2nd lactic result 05/10/2023 @1504 -Alayna Holdsworth,PA-C                        Clinical Impression:   Final diagnoses:  [R00.0] Tachycardia  [J02.0] Strep pharyngitis  [J36] Peritonsillar abscess (Primary)        ED Disposition Condition    Discharge Stable          ED Prescriptions       Medication Sig Dispense Start Date End Date Auth. Provider    amoxicillin-clavulanate  875-125mg (AUGMENTIN) 875-125 mg per tablet  (Status: Discontinued) Take 1 tablet by mouth 2 (two) times daily. 14 tablet 5/10/2023 5/10/2023 Chester Taylor MD    dexAMETHasone (DECADRON) 4 MG Tab  (Status: Discontinued) Take 3 tablets (12 mg total) by mouth every 12 (twelve) hours. for 1 day 6 tablet 5/11/2023 5/10/2023 Chester Taylor MD    HYDROcodone-acetaminophen (NORCO) 5-325 mg per tablet  (Status: Discontinued) Take 1 tablet by mouth every 6 (six) hours as needed for Pain. 12 tablet 5/10/2023 5/10/2023 Chester Taylor MD    ibuprofen (ADVIL,MOTRIN) 400 MG tablet  (Status: Discontinued) Take 1.5 tablets (600 mg total) by mouth every 6 (six) hours as needed for Other (pain). 20 tablet 5/10/2023 5/10/2023 Chester Taylor MD    ondansetron (ZOFRAN-ODT) 4 MG TbDL  (Status: Discontinued) Take 1 tablet (4 mg total) by mouth every 8 (eight) hours as needed. 20 tablet 5/10/2023 5/10/2023 Chester Taylor MD    amoxicillin-clavulanate 875-125mg (AUGMENTIN) 875-125 mg per tablet Take 1 tablet by mouth 2 (two) times daily. 14 tablet 5/10/2023 -- Chester Taylor MD    dexAMETHasone (DECADRON) 4 MG Tab Take 3 tablets (12 mg total) by mouth every 12 (twelve) hours. for 1 day 6 tablet 5/11/2023 5/12/2023 Chester Taylor MD    HYDROcodone-acetaminophen (NORCO) 5-325 mg per tablet Take 1 tablet by mouth every 6 (six) hours as needed for Pain. 12 tablet 5/10/2023 -- Chester Taylor MD    ibuprofen (ADVIL,MOTRIN) 400 MG tablet Take 1.5 tablets (600 mg total) by mouth every 6 (six) hours as needed for Other (pain). 20 tablet 5/10/2023 -- Chester Taylor MD    ondansetron (ZOFRAN-ODT) 4 MG TbDL Take 1 tablet (4 mg total) by mouth every 8 (eight) hours as needed. 20 tablet 5/10/2023 -- Chester Taylor MD          Follow-up Information       Follow up With Specialties Details Why Contact Info    [facility] - Emergency Dept Emergency Medicine Go to  If symptoms worsen [address]  [address]  [facility] [address]  [phone]    Vikki Robles MD Otolaryngology Schedule an appointment as soon as possible for a visit   [address] [place] [address]  [phone]               Alayna Holdsworth, PA-C  05/11/23 0629

## 2023-05-11 LAB
GRAM STN SPEC: NORMAL
GRAM STN SPEC: NORMAL

## 2023-05-12 LAB — BACTERIA SPEC AEROBE CULT: ABNORMAL

## 2023-05-14 LAB
BACTERIA BLD CULT: NORMAL
BACTERIA BLD CULT: NORMAL

## 2023-05-15 LAB — BACTERIA SPEC ANAEROBE CULT: ABNORMAL

## 2023-08-17 ENCOUNTER — OFFICE VISIT (OUTPATIENT)
Dept: OBSTETRICS AND GYNECOLOGY | Facility: CLINIC | Age: 28
End: 2023-08-17
Payer: MEDICAID

## 2023-08-17 VITALS
SYSTOLIC BLOOD PRESSURE: 130 MMHG | DIASTOLIC BLOOD PRESSURE: 82 MMHG | WEIGHT: 257.94 LBS | HEIGHT: 67 IN | BODY MASS INDEX: 40.48 KG/M2

## 2023-08-17 DIAGNOSIS — Z11.3 SCREEN FOR STD (SEXUALLY TRANSMITTED DISEASE): ICD-10-CM

## 2023-08-17 DIAGNOSIS — F17.200 TOBACCO USE DISORDER: ICD-10-CM

## 2023-08-17 DIAGNOSIS — Z01.419 WELL WOMAN EXAM WITH ROUTINE GYNECOLOGICAL EXAM: Primary | ICD-10-CM

## 2023-08-17 DIAGNOSIS — Z30.09 FAMILY PLANNING: ICD-10-CM

## 2023-08-17 PROCEDURE — 3008F PR BODY MASS INDEX (BMI) DOCUMENTED: ICD-10-PCS | Mod: CPTII,,, | Performed by: OBSTETRICS & GYNECOLOGY

## 2023-08-17 PROCEDURE — 3079F DIAST BP 80-89 MM HG: CPT | Mod: CPTII,,, | Performed by: OBSTETRICS & GYNECOLOGY

## 2023-08-17 PROCEDURE — 99999 PR PBB SHADOW E&M-EST. PATIENT-LVL III: ICD-10-PCS | Mod: PBBFAC,,, | Performed by: OBSTETRICS & GYNECOLOGY

## 2023-08-17 PROCEDURE — 87624 HPV HI-RISK TYP POOLED RSLT: CPT | Performed by: OBSTETRICS & GYNECOLOGY

## 2023-08-17 PROCEDURE — 1159F PR MEDICATION LIST DOCUMENTED IN MEDICAL RECORD: ICD-10-PCS | Mod: CPTII,,, | Performed by: OBSTETRICS & GYNECOLOGY

## 2023-08-17 PROCEDURE — 99213 OFFICE O/P EST LOW 20 MIN: CPT | Mod: PBBFAC | Performed by: OBSTETRICS & GYNECOLOGY

## 2023-08-17 PROCEDURE — 3075F SYST BP GE 130 - 139MM HG: CPT | Mod: CPTII,,, | Performed by: OBSTETRICS & GYNECOLOGY

## 2023-08-17 PROCEDURE — 88175 CYTOPATH C/V AUTO FLUID REDO: CPT | Performed by: OBSTETRICS & GYNECOLOGY

## 2023-08-17 PROCEDURE — 1160F RVW MEDS BY RX/DR IN RCRD: CPT | Mod: CPTII,,, | Performed by: OBSTETRICS & GYNECOLOGY

## 2023-08-17 PROCEDURE — 1159F MED LIST DOCD IN RCRD: CPT | Mod: CPTII,,, | Performed by: OBSTETRICS & GYNECOLOGY

## 2023-08-17 PROCEDURE — 3079F PR MOST RECENT DIASTOLIC BLOOD PRESSURE 80-89 MM HG: ICD-10-PCS | Mod: CPTII,,, | Performed by: OBSTETRICS & GYNECOLOGY

## 2023-08-17 PROCEDURE — 99999 PR PBB SHADOW E&M-EST. PATIENT-LVL III: CPT | Mod: PBBFAC,,, | Performed by: OBSTETRICS & GYNECOLOGY

## 2023-08-17 PROCEDURE — 87591 N.GONORRHOEAE DNA AMP PROB: CPT | Performed by: OBSTETRICS & GYNECOLOGY

## 2023-08-17 PROCEDURE — 99395 PR PREVENTIVE VISIT,EST,18-39: ICD-10-PCS | Mod: S$PBB,,, | Performed by: OBSTETRICS & GYNECOLOGY

## 2023-08-17 PROCEDURE — 3008F BODY MASS INDEX DOCD: CPT | Mod: CPTII,,, | Performed by: OBSTETRICS & GYNECOLOGY

## 2023-08-17 PROCEDURE — 3075F PR MOST RECENT SYSTOLIC BLOOD PRESS GE 130-139MM HG: ICD-10-PCS | Mod: CPTII,,, | Performed by: OBSTETRICS & GYNECOLOGY

## 2023-08-17 PROCEDURE — 99395 PREV VISIT EST AGE 18-39: CPT | Mod: S$PBB,,, | Performed by: OBSTETRICS & GYNECOLOGY

## 2023-08-17 PROCEDURE — 1160F PR REVIEW ALL MEDS BY PRESCRIBER/CLIN PHARMACIST DOCUMENTED: ICD-10-PCS | Mod: CPTII,,, | Performed by: OBSTETRICS & GYNECOLOGY

## 2023-08-17 RX ORDER — ETONOGESTREL AND ETHINYL ESTRADIOL VAGINAL RING .015; .12 MG/D; MG/D
1 RING VAGINAL
Qty: 1 EACH | Refills: 11 | Status: SHIPPED | OUTPATIENT
Start: 2023-08-17 | End: 2024-08-16

## 2023-08-20 LAB
C TRACH DNA SPEC QL NAA+PROBE: NOT DETECTED
N GONORRHOEA DNA SPEC QL NAA+PROBE: NOT DETECTED

## 2023-08-23 LAB
HPV HR 12 DNA SPEC QL NAA+PROBE: NEGATIVE
HPV16 AG SPEC QL: NEGATIVE
HPV18 DNA SPEC QL NAA+PROBE: NEGATIVE

## 2023-08-24 ENCOUNTER — TELEPHONE (OUTPATIENT)
Dept: SMOKING CESSATION | Facility: CLINIC | Age: 28
End: 2023-08-24
Payer: MEDICAID

## 2023-08-24 LAB
FINAL PATHOLOGIC DIAGNOSIS: NORMAL
Lab: NORMAL

## 2023-08-24 NOTE — TELEPHONE ENCOUNTER
Smoking Cessation counselor attempted to contact patient regarding no show appointment for intake visit.  Counselor left a message with rescheduling information.      Cheli Thomas RRT,MSW,LMSW,TTS  [phone]

## 2023-09-18 ENCOUNTER — PATIENT MESSAGE (OUTPATIENT)
Dept: PRIMARY CARE CLINIC | Facility: CLINIC | Age: 28
End: 2023-09-18
Payer: MEDICAID

## 2023-10-18 ENCOUNTER — PATIENT MESSAGE (OUTPATIENT)
Dept: CARDIOLOGY | Facility: CLINIC | Age: 28
End: 2023-10-18
Payer: MEDICAID

## 2023-11-01 ENCOUNTER — PATIENT MESSAGE (OUTPATIENT)
Dept: OBSTETRICS AND GYNECOLOGY | Facility: CLINIC | Age: 28
End: 2023-11-01
Payer: MEDICAID

## 2023-11-14 ENCOUNTER — OFFICE VISIT (OUTPATIENT)
Dept: OBSTETRICS AND GYNECOLOGY | Facility: CLINIC | Age: 28
End: 2023-11-14
Payer: MEDICAID

## 2023-11-14 VITALS
BODY MASS INDEX: 41.28 KG/M2 | SYSTOLIC BLOOD PRESSURE: 123 MMHG | DIASTOLIC BLOOD PRESSURE: 80 MMHG | WEIGHT: 263 LBS | HEIGHT: 67 IN

## 2023-11-14 DIAGNOSIS — N76.0 ACUTE VAGINITIS: Primary | ICD-10-CM

## 2023-11-14 PROCEDURE — 99213 OFFICE O/P EST LOW 20 MIN: CPT | Mod: PBBFAC | Performed by: STUDENT IN AN ORGANIZED HEALTH CARE EDUCATION/TRAINING PROGRAM

## 2023-11-14 PROCEDURE — 3074F PR MOST RECENT SYSTOLIC BLOOD PRESSURE < 130 MM HG: ICD-10-PCS | Mod: CPTII,,, | Performed by: STUDENT IN AN ORGANIZED HEALTH CARE EDUCATION/TRAINING PROGRAM

## 2023-11-14 PROCEDURE — 3008F PR BODY MASS INDEX (BMI) DOCUMENTED: ICD-10-PCS | Mod: CPTII,,, | Performed by: STUDENT IN AN ORGANIZED HEALTH CARE EDUCATION/TRAINING PROGRAM

## 2023-11-14 PROCEDURE — 99213 OFFICE O/P EST LOW 20 MIN: CPT | Mod: S$PBB,,, | Performed by: STUDENT IN AN ORGANIZED HEALTH CARE EDUCATION/TRAINING PROGRAM

## 2023-11-14 PROCEDURE — 99999 PR PBB SHADOW E&M-EST. PATIENT-LVL III: CPT | Mod: PBBFAC,,, | Performed by: STUDENT IN AN ORGANIZED HEALTH CARE EDUCATION/TRAINING PROGRAM

## 2023-11-14 PROCEDURE — 3008F BODY MASS INDEX DOCD: CPT | Mod: CPTII,,, | Performed by: STUDENT IN AN ORGANIZED HEALTH CARE EDUCATION/TRAINING PROGRAM

## 2023-11-14 PROCEDURE — 1159F MED LIST DOCD IN RCRD: CPT | Mod: CPTII,,, | Performed by: STUDENT IN AN ORGANIZED HEALTH CARE EDUCATION/TRAINING PROGRAM

## 2023-11-14 PROCEDURE — 1160F PR REVIEW ALL MEDS BY PRESCRIBER/CLIN PHARMACIST DOCUMENTED: ICD-10-PCS | Mod: CPTII,,, | Performed by: STUDENT IN AN ORGANIZED HEALTH CARE EDUCATION/TRAINING PROGRAM

## 2023-11-14 PROCEDURE — 81514 NFCT DS BV&VAGINITIS DNA ALG: CPT | Performed by: STUDENT IN AN ORGANIZED HEALTH CARE EDUCATION/TRAINING PROGRAM

## 2023-11-14 PROCEDURE — 3079F DIAST BP 80-89 MM HG: CPT | Mod: CPTII,,, | Performed by: STUDENT IN AN ORGANIZED HEALTH CARE EDUCATION/TRAINING PROGRAM

## 2023-11-14 PROCEDURE — 1160F RVW MEDS BY RX/DR IN RCRD: CPT | Mod: CPTII,,, | Performed by: STUDENT IN AN ORGANIZED HEALTH CARE EDUCATION/TRAINING PROGRAM

## 2023-11-14 PROCEDURE — 3074F SYST BP LT 130 MM HG: CPT | Mod: CPTII,,, | Performed by: STUDENT IN AN ORGANIZED HEALTH CARE EDUCATION/TRAINING PROGRAM

## 2023-11-14 PROCEDURE — 1159F PR MEDICATION LIST DOCUMENTED IN MEDICAL RECORD: ICD-10-PCS | Mod: CPTII,,, | Performed by: STUDENT IN AN ORGANIZED HEALTH CARE EDUCATION/TRAINING PROGRAM

## 2023-11-14 PROCEDURE — 3079F PR MOST RECENT DIASTOLIC BLOOD PRESSURE 80-89 MM HG: ICD-10-PCS | Mod: CPTII,,, | Performed by: STUDENT IN AN ORGANIZED HEALTH CARE EDUCATION/TRAINING PROGRAM

## 2023-11-14 PROCEDURE — 87591 N.GONORRHOEAE DNA AMP PROB: CPT | Performed by: STUDENT IN AN ORGANIZED HEALTH CARE EDUCATION/TRAINING PROGRAM

## 2023-11-14 PROCEDURE — 99999 PR PBB SHADOW E&M-EST. PATIENT-LVL III: ICD-10-PCS | Mod: PBBFAC,,, | Performed by: STUDENT IN AN ORGANIZED HEALTH CARE EDUCATION/TRAINING PROGRAM

## 2023-11-14 PROCEDURE — 99213 PR OFFICE/OUTPT VISIT, EST, LEVL III, 20-29 MIN: ICD-10-PCS | Mod: S$PBB,,, | Performed by: STUDENT IN AN ORGANIZED HEALTH CARE EDUCATION/TRAINING PROGRAM

## 2023-11-14 NOTE — PROGRESS NOTES
Subjective:      Patient ID: Ghada Mckeon is a 28 y.o. female.    Chief Complaint:  vaginal odor (Patient c/o chronic odor. This has been happening for some months now.)      History of Present Illness  28  presents for evaluation of vaginal odor and discharge.   Reports multiple positive tests for BV in the past, feels like she has a change in odor after each menstrual cycle. Has tried clindamycin and metronidazole in the past with some relief but interested in suppressive therapy.  Has not been sexually active recently.       GYN & OB History  Patient's last menstrual period was 10/24/2023 (approximate).   Date of Last Pap: 2023    OB History    Para Term  AB Living   2 2 2 0 0 2   SAB IAB Ectopic Multiple Live Births   0 0 0   2      # Outcome Date GA Lbr Dawson/2nd Weight Sex Delivery Anes PTL Lv   2 Term 10/22/14 39w0d  3.43 kg (7 lb 9 oz) F CS-LTranv EPI  MARCY      Complications: Seizures During Labor, Eclampsia (toxemia of pregnancy)   1 Term 13 40w4d  3.487 kg (7 lb 11 oz) F CS-LTranv EPI N MARCY      Complications: Failed induction       Review of Systems  Review of Systems   Genitourinary:  Positive for vaginal discharge.   All other systems reviewed and are negative.         Objective:     Physical Exam:   Constitutional: She appears well-developed and well-nourished.    HENT:   Head: Normocephalic and atraumatic.    Eyes: EOM are normal.      Pulmonary/Chest: Effort normal.        Abdominal: Soft.     Genitourinary:    Uterus, right adnexa and left adnexa normal.      Pelvic exam was performed with patient in the lithotomy position.   The external female genitalia was normal.   Genitalia hair distrobution normal .   There is no lesion on the right labia. There is no lesion on the left labia. Cervix is normal. There is vaginal discharge in the vagina.    Genitourinary Comments: Chaperone present for duration of exam             Musculoskeletal: Normal range of motion.        Neurological: She is alert.    Skin: Skin is warm and dry.    Psychiatric: She has a normal mood and affect.         Assessment:     1. Acute vaginitis          Plan:     1. Acute vaginitis  - Discussed waiting for results of current tests, pt interested in pursuing suppressive treatment with metrogel for 16 weeks and assessing whether her symptoms have improved.  - discussed vulvovaginal hygiene, washing with water/plain soaps  - C. trachomatis/N. gonorrhoeae by AMP DNA [facility]; Cervix  - Vaginosis Screen by DNA Probe    Kike Maxwell III, MD  [facility] [place] [facility]   [address]  [place] [id number] [phone]

## 2023-11-17 LAB
BACTERIAL VAGINOSIS DNA: POSITIVE
C TRACH DNA SPEC QL NAA+PROBE: NOT DETECTED
CANDIDA GLABRATA DNA: NEGATIVE
CANDIDA KRUSEI DNA: NEGATIVE
CANDIDA RRNA VAG QL PROBE: NEGATIVE
N GONORRHOEA DNA SPEC QL NAA+PROBE: NOT DETECTED
T VAGINALIS RRNA GENITAL QL PROBE: NEGATIVE

## 2023-11-18 ENCOUNTER — PATIENT MESSAGE (OUTPATIENT)
Dept: OBSTETRICS AND GYNECOLOGY | Facility: CLINIC | Age: 28
End: 2023-11-18
Payer: MEDICAID

## 2023-11-20 DIAGNOSIS — N76.0 BACTERIAL VAGINAL INFECTION: Primary | ICD-10-CM

## 2023-11-20 DIAGNOSIS — B96.89 BACTERIAL VAGINAL INFECTION: Primary | ICD-10-CM

## 2023-11-20 RX ORDER — METRONIDAZOLE 7.5 MG/G
GEL TOPICAL
Qty: 135 G | Refills: 1 | Status: SHIPPED | OUTPATIENT
Start: 2023-11-20

## 2023-11-20 RX ORDER — METRONIDAZOLE 500 MG/1
TABLET ORAL
Qty: 14 TABLET | Refills: 0 | Status: SHIPPED | OUTPATIENT
Start: 2023-11-20

## 2024-06-14 ENCOUNTER — HOSPITAL ENCOUNTER (EMERGENCY)
Facility: HOSPITAL | Age: 29
Discharge: HOME | End: 2024-06-15
Attending: STUDENT IN AN ORGANIZED HEALTH CARE EDUCATION/TRAINING PROGRAM
Payer: MEDICAID

## 2024-06-14 DIAGNOSIS — N39.0 URINARY TRACT INFECTION WITHOUT HEMATURIA, SITE UNSPECIFIED: ICD-10-CM

## 2024-06-14 DIAGNOSIS — R00.0 TACHYCARDIA: ICD-10-CM

## 2024-06-14 DIAGNOSIS — U07.1 COVID-19: Primary | ICD-10-CM

## 2024-06-14 LAB
ALBUMIN SERPL BCP-MCNC: 3.5 G/DL (ref 3.5–5.2)
ALLENS TEST: NORMAL
ALP SERPL-CCNC: 77 U/L (ref 55–135)
ALT SERPL W/O P-5'-P-CCNC: 34 U/L (ref 10–44)
ANION GAP SERPL CALC-SCNC: 10 MMOL/L (ref 8–16)
AST SERPL-CCNC: 31 U/L (ref 10–40)
B-HCG UR QL: NEGATIVE
BACTERIA #/AREA URNS HPF: ABNORMAL /HPF
BACTERIA GENITAL QL WET PREP: ABNORMAL
BASOPHILS # BLD AUTO: 0.03 K/UL (ref 0–0.2)
BASOPHILS NFR BLD: 0.4 % (ref 0–1.9)
BILIRUB SERPL-MCNC: 0.3 MG/DL (ref 0.1–1)
BILIRUB UR QL STRIP: NEGATIVE
BUN SERPL-MCNC: 13 MG/DL (ref 6–20)
CALCIUM SERPL-MCNC: 9.6 MG/DL (ref 8.7–10.5)
CHLORIDE SERPL-SCNC: 106 MMOL/L (ref 95–110)
CLARITY UR: CLEAR
CLUE CELLS VAG QL WET PREP: ABNORMAL
CO2 SERPL-SCNC: 21 MMOL/L (ref 23–29)
COLOR UR: YELLOW
CREAT SERPL-MCNC: 1.1 MG/DL (ref 0.5–1.4)
CTP QC/QA: YES
DELSYS: NORMAL
DIFFERENTIAL METHOD BLD: ABNORMAL
EOSINOPHIL # BLD AUTO: 0 K/UL (ref 0–0.5)
EOSINOPHIL NFR BLD: 0.4 % (ref 0–8)
ERYTHROCYTE [DISTWIDTH] IN BLOOD BY AUTOMATED COUNT: 13.4 % (ref 11.5–14.5)
EST. GFR  (NO RACE VARIABLE): >60 ML/MIN/1.73 M^2
FILAMENT FUNGI VAG WET PREP-#/AREA: ABNORMAL
GLUCOSE SERPL-MCNC: 88 MG/DL (ref 70–110)
GLUCOSE UR QL STRIP: NEGATIVE
HCT VFR BLD AUTO: 35.6 % (ref 37–48.5)
HGB BLD-MCNC: 11 G/DL (ref 12–16)
HGB UR QL STRIP: NEGATIVE
IMM GRANULOCYTES # BLD AUTO: 0.02 K/UL (ref 0–0.04)
IMM GRANULOCYTES NFR BLD AUTO: 0.3 % (ref 0–0.5)
KETONES UR QL STRIP: NEGATIVE
LDH SERPL L TO P-CCNC: 0.52 MMOL/L (ref 0.5–2.2)
LEUKOCYTE ESTERASE UR QL STRIP: ABNORMAL
LYMPHOCYTES # BLD AUTO: 1.5 K/UL (ref 1–4.8)
LYMPHOCYTES NFR BLD: 19.4 % (ref 18–48)
MCH RBC QN AUTO: 26.3 PG (ref 27–31)
MCHC RBC AUTO-ENTMCNC: 30.9 G/DL (ref 32–36)
MCV RBC AUTO: 85 FL (ref 82–98)
MICROSCOPIC COMMENT: ABNORMAL
MOLECULAR STREP A: NEGATIVE
MONOCYTES # BLD AUTO: 0.3 K/UL (ref 0.3–1)
MONOCYTES NFR BLD: 3.9 % (ref 4–15)
NEUTROPHILS # BLD AUTO: 5.9 K/UL (ref 1.8–7.7)
NEUTROPHILS NFR BLD: 75.6 % (ref 38–73)
NITRITE UR QL STRIP: NEGATIVE
NRBC BLD-RTO: 0 /100 WBC
PH UR STRIP: 6 [PH] (ref 5–8)
PLATELET # BLD AUTO: 235 K/UL (ref 150–450)
PMV BLD AUTO: 12.9 FL (ref 9.2–12.9)
POC MOLECULAR INFLUENZA A AGN: NEGATIVE
POC MOLECULAR INFLUENZA B AGN: NEGATIVE
POTASSIUM SERPL-SCNC: 3.7 MMOL/L (ref 3.5–5.1)
PROCALCITONIN SERPL IA-MCNC: 0.07 NG/ML
PROT SERPL-MCNC: 8 G/DL (ref 6–8.4)
PROT UR QL STRIP: NEGATIVE
RBC # BLD AUTO: 4.18 M/UL (ref 4–5.4)
RBC #/AREA URNS HPF: 1 /HPF (ref 0–4)
SAMPLE: NORMAL
SARS-COV-2 RDRP RESP QL NAA+PROBE: POSITIVE
SITE: NORMAL
SODIUM SERPL-SCNC: 137 MMOL/L (ref 136–145)
SP GR UR STRIP: 1.02 (ref 1–1.03)
SPECIMEN SOURCE: ABNORMAL
SQUAMOUS #/AREA URNS HPF: 1 /HPF
T VAGINALIS GENITAL QL WET PREP: ABNORMAL
URN SPEC COLLECT METH UR: ABNORMAL
UROBILINOGEN UR STRIP-ACNC: NEGATIVE EU/DL
WBC # BLD AUTO: 7.79 K/UL (ref 3.9–12.7)
WBC #/AREA URNS HPF: 16 /HPF (ref 0–5)
WBC #/AREA VAG WET PREP: ABNORMAL
WBC CLUMPS URNS QL MICRO: ABNORMAL
YEAST GENITAL QL WET PREP: ABNORMAL

## 2024-06-14 PROCEDURE — 99285 EMERGENCY DEPT VISIT HI MDM: CPT | Mod: 25

## 2024-06-14 PROCEDURE — 87491 CHLMYD TRACH DNA AMP PROBE: CPT | Performed by: STUDENT IN AN ORGANIZED HEALTH CARE EDUCATION/TRAINING PROGRAM

## 2024-06-14 PROCEDURE — 80053 COMPREHEN METABOLIC PANEL: CPT | Performed by: STUDENT IN AN ORGANIZED HEALTH CARE EDUCATION/TRAINING PROGRAM

## 2024-06-14 PROCEDURE — 87502 INFLUENZA DNA AMP PROBE: CPT

## 2024-06-14 PROCEDURE — 83605 ASSAY OF LACTIC ACID: CPT

## 2024-06-14 PROCEDURE — 85025 COMPLETE CBC W/AUTO DIFF WBC: CPT | Performed by: STUDENT IN AN ORGANIZED HEALTH CARE EDUCATION/TRAINING PROGRAM

## 2024-06-14 PROCEDURE — 96365 THER/PROPH/DIAG IV INF INIT: CPT

## 2024-06-14 PROCEDURE — 87651 STREP A DNA AMP PROBE: CPT

## 2024-06-14 PROCEDURE — 93005 ELECTROCARDIOGRAM TRACING: CPT

## 2024-06-14 PROCEDURE — 81000 URINALYSIS NONAUTO W/SCOPE: CPT | Performed by: STUDENT IN AN ORGANIZED HEALTH CARE EDUCATION/TRAINING PROGRAM

## 2024-06-14 PROCEDURE — 63600175 PHARM REV CODE 636 W HCPCS: Performed by: STUDENT IN AN ORGANIZED HEALTH CARE EDUCATION/TRAINING PROGRAM

## 2024-06-14 PROCEDURE — 87635 SARS-COV-2 COVID-19 AMP PRB: CPT | Performed by: STUDENT IN AN ORGANIZED HEALTH CARE EDUCATION/TRAINING PROGRAM

## 2024-06-14 PROCEDURE — 99900035 HC TECH TIME PER 15 MIN (STAT)

## 2024-06-14 PROCEDURE — 87210 SMEAR WET MOUNT SALINE/INK: CPT | Performed by: STUDENT IN AN ORGANIZED HEALTH CARE EDUCATION/TRAINING PROGRAM

## 2024-06-14 PROCEDURE — 25000003 PHARM REV CODE 250: Performed by: STUDENT IN AN ORGANIZED HEALTH CARE EDUCATION/TRAINING PROGRAM

## 2024-06-14 PROCEDURE — 87040 BLOOD CULTURE FOR BACTERIA: CPT | Mod: 59 | Performed by: STUDENT IN AN ORGANIZED HEALTH CARE EDUCATION/TRAINING PROGRAM

## 2024-06-14 PROCEDURE — 93010 ELECTROCARDIOGRAM REPORT: CPT | Mod: ,,, | Performed by: INTERNAL MEDICINE

## 2024-06-14 PROCEDURE — 87086 URINE CULTURE/COLONY COUNT: CPT | Performed by: STUDENT IN AN ORGANIZED HEALTH CARE EDUCATION/TRAINING PROGRAM

## 2024-06-14 PROCEDURE — 81025 URINE PREGNANCY TEST: CPT | Performed by: STUDENT IN AN ORGANIZED HEALTH CARE EDUCATION/TRAINING PROGRAM

## 2024-06-14 PROCEDURE — 84145 PROCALCITONIN (PCT): CPT | Performed by: STUDENT IN AN ORGANIZED HEALTH CARE EDUCATION/TRAINING PROGRAM

## 2024-06-14 RX ORDER — DOXYCYCLINE 100 MG/1
100 CAPSULE ORAL 2 TIMES DAILY
Qty: 14 CAPSULE | Refills: 0 | Status: SHIPPED | OUTPATIENT
Start: 2024-06-14 | End: 2024-06-21

## 2024-06-14 RX ORDER — ACETAMINOPHEN 500 MG
1000 TABLET ORAL
Status: COMPLETED | OUTPATIENT
Start: 2024-06-14 | End: 2024-06-14

## 2024-06-14 RX ORDER — DOXYCYCLINE HYCLATE 100 MG
100 TABLET ORAL
Status: COMPLETED | OUTPATIENT
Start: 2024-06-14 | End: 2024-06-15

## 2024-06-14 RX ORDER — CEPHALEXIN 500 MG/1
500 CAPSULE ORAL 4 TIMES DAILY
Qty: 28 CAPSULE | Refills: 0 | Status: SHIPPED | OUTPATIENT
Start: 2024-06-14 | End: 2024-06-21

## 2024-06-14 RX ORDER — DIPHENHYDRAMINE HCL 25 MG
50 CAPSULE ORAL
Status: COMPLETED | OUTPATIENT
Start: 2024-06-14 | End: 2024-06-14

## 2024-06-14 RX ADMIN — SODIUM CHLORIDE 1779 ML: 9 INJECTION, SOLUTION INTRAVENOUS at 10:06

## 2024-06-14 RX ADMIN — PIPERACILLIN AND TAZOBACTAM 4.5 G: 4; .5 INJECTION, POWDER, LYOPHILIZED, FOR SOLUTION INTRAVENOUS; PARENTERAL at 10:06

## 2024-06-14 RX ADMIN — ACETAMINOPHEN 1000 MG: 500 TABLET ORAL at 09:06

## 2024-06-14 RX ADMIN — DIPHENHYDRAMINE HYDROCHLORIDE 50 MG: 25 CAPSULE ORAL at 09:06

## 2024-06-15 VITALS
HEART RATE: 86 BPM | WEIGHT: 216 LBS | TEMPERATURE: 98 F | HEIGHT: 66 IN | BODY MASS INDEX: 34.72 KG/M2 | RESPIRATION RATE: 33 BRPM | SYSTOLIC BLOOD PRESSURE: 189 MMHG | OXYGEN SATURATION: 97 % | DIASTOLIC BLOOD PRESSURE: 97 MMHG

## 2024-06-15 PROCEDURE — 96367 TX/PROPH/DG ADDL SEQ IV INF: CPT

## 2024-06-15 PROCEDURE — 63600175 PHARM REV CODE 636 W HCPCS: Performed by: STUDENT IN AN ORGANIZED HEALTH CARE EDUCATION/TRAINING PROGRAM

## 2024-06-15 PROCEDURE — 25000003 PHARM REV CODE 250: Performed by: STUDENT IN AN ORGANIZED HEALTH CARE EDUCATION/TRAINING PROGRAM

## 2024-06-15 RX ADMIN — DOXYCYCLINE HYCLATE 100 MG: 100 TABLET, COATED ORAL at 12:06

## 2024-06-15 RX ADMIN — CEFTRIAXONE 2 G: 2 INJECTION, POWDER, FOR SOLUTION INTRAMUSCULAR; INTRAVENOUS at 12:06

## 2024-06-15 NOTE — ED PROVIDER NOTES
Encounter Date: 2024       History     Chief Complaint   Patient presents with    Sore Throat    Chills     Pt reports sore throat and flu like symptoms with symptoms progressing over 3 days. Pt denies taking any OTC flu/cold medications for symptom relief.      28 y.o. female who has a past medical history of Depression and Hypertension presents to the emergency department with progressively worsening sore throat, fevers and chills for the past 3 days.  He reports attempting to take over-the-counter medication without any improvement of her symptoms so came to the emergency department for evaluation.  Denies any difficulty breathing.  Denies any recent sick contacts.  Denies any chest pain or shortness of breath.  Denies any abdominal pain nausea or vomiting or diarrhea.  She does report recently having unprotected sexual intercourse and notes mild vaginal discharge.  Denies any pelvic pain.  Denies any dysuria.     The history is provided by the patient.     Review of patient's allergies indicates:   Allergen Reactions    Perflutren lipid microspheres Other (See Comments)     Chest tightness, back pain     Past Medical History:   Diagnosis Date    Depression     Hypertension      Past Surgical History:   Procedure Laterality Date     SECTION       Family History   Problem Relation Name Age of Onset    Hypertension Paternal Grandmother      Hypertension Maternal Grandmother      Diabetes Maternal Grandmother      Diabetes Maternal Grandfather      Heart failure Maternal Grandfather      Breast cancer Neg Hx      Colon cancer Neg Hx      Ovarian cancer Neg Hx       Social History     Tobacco Use    Smoking status: Some Days     Types: Vaping with nicotine    Smokeless tobacco: Current   Substance Use Topics    Alcohol use: No    Drug use: No     Review of Systems   Constitutional:  Positive for chills and fever.   HENT:  Positive for sore throat. Negative for congestion and rhinorrhea.    Eyes:   Negative for pain.   Respiratory:  Negative for cough and shortness of breath.    Cardiovascular:  Negative for chest pain and leg swelling.   Gastrointestinal:  Negative for abdominal pain, nausea and vomiting.   Genitourinary:  Positive for vaginal discharge. Negative for dysuria and hematuria.   Musculoskeletal:  Negative for joint swelling and neck pain.   Skin:  Negative for rash.   Neurological:  Negative for weakness and headaches.       Physical Exam     Initial Vitals [06/14/24 2055]   BP Pulse Resp Temp SpO2   (!) 181/91 (!) 119 17 (!) 100.7 °F (38.2 °C) 98 %      MAP       --         Physical Exam    Nursing note and vitals reviewed.  Constitutional: She is not diaphoretic. No distress.   HENT:   Head: Normocephalic and atraumatic.   Eyes: Conjunctivae and EOM are normal. Pupils are equal, round, and reactive to light.   Neck:   Normal range of motion.  Cardiovascular:    Tachycardia present.         Pulmonary/Chest: Breath sounds normal. No respiratory distress.   Abdominal: Abdomen is soft. Bowel sounds are normal. She exhibits no distension. There is no abdominal tenderness.   Musculoskeletal:         General: No tenderness. Normal range of motion.      Cervical back: Normal range of motion.     Neurological: She is alert.   Skin: Skin is warm. Capillary refill takes less than 2 seconds.   Psychiatric: Her behavior is normal.         ED Course   Procedures  Labs Reviewed   VAGINAL SCREEN - Abnormal; Notable for the following components:       Result Value    WBC - Vaginal Screen Few (*)     Bacteria - Vaginal Screen Moderate (*)     All other components within normal limits    Narrative:     Release to patient->Immediate   CBC W/ AUTO DIFFERENTIAL - Abnormal; Notable for the following components:    Hemoglobin 11.0 (*)     Hematocrit 35.6 (*)     MCH 26.3 (*)     MCHC 30.9 (*)     Gran % 75.6 (*)     Mono % 3.9 (*)     All other components within normal limits   COMPREHENSIVE METABOLIC PANEL -  Abnormal; Notable for the following components:    CO2 21 (*)     All other components within normal limits   URINALYSIS, REFLEX TO URINE CULTURE - Abnormal; Notable for the following components:    Leukocytes, UA 3+ (*)     All other components within normal limits    Narrative:     Specimen Source->Urine   URINALYSIS MICROSCOPIC - Abnormal; Notable for the following components:    WBC, UA 16 (*)     WBC Clumps, UA Occasional (*)     Bacteria Many (*)     All other components within normal limits    Narrative:     Specimen Source->Urine   SARS-COV-2 RDRP GENE - Abnormal; Notable for the following components:    POC Rapid COVID Positive (*)     All other components within normal limits   CULTURE, BLOOD    Narrative:     Aerobic and anaerobic   CULTURE, BLOOD    Narrative:     Aerobic and anaerobic   CULTURE, URINE   PROCALCITONIN   PREGNANCY TEST, URINE RAPID   PREGNANCY TEST, URINE RAPID    Narrative:     Specimen Source->Urine   C. TRACHOMATIS/N. GONORRHOEAE BY AMP DNA   POCT STREP A MOLECULAR   POCT INFLUENZA A/B MOLECULAR   ISTAT LACTATE          Imaging Results              X-Ray Chest AP Portable (Final result)  Result time 06/14/24 22:08:17      Final result by Geni Reddy MD (06/14/24 22:08:17)                   Impression:      No acute cardiopulmonary process identified.      Electronically signed by: Geni Reddy MD  Date:    06/14/2024  Time:    22:08               Narrative:    EXAMINATION:  XR CHEST AP PORTABLE    CLINICAL HISTORY:  Sepsis;    TECHNIQUE:  Single frontal view of the chest was performed.    COMPARISON:  May 2023.    FINDINGS:  Cardiac silhouette is normal in size.  Lungs are symmetrically expanded.  No evidence of focal consolidative process, pneumothorax, or significant pleural effusion.  No acute osseous abnormality identified.                                       Medications   sodium chloride 0.9% bolus 1,779 mL 1,779 mL (0 mLs Intravenous Stopped 6/14/24 4319)    acetaminophen tablet 1,000 mg (1,000 mg Oral Given 6/14/24 2143)   diphenhydrAMINE capsule 50 mg (50 mg Oral Given 6/14/24 2143)   cefTRIAXone (ROCEPHIN) 2 g in dextrose 5 % in water (D5W) 100 mL IVPB (MB+) (0 g Intravenous Stopped 6/15/24 0044)   doxycycline tablet 100 mg (100 mg Oral Given 6/15/24 0014)     Medical Decision Making:   History:   Old Medical Records: I decided to obtain old medical records.  Initial Assessment:   28 y.o. female who has a past medical history of Depression and Hypertension presents to the emergency department with progressively worsening sore throat, fevers and chills for the past 3 days.  No acute distress vitals notable for tachycardia and fever concerning for sepsis.  Code sepsis activated upon patient's presentation.  Patient given Tylenol for fever.  Empiric antibiotics began.  IV feel it started.  Given patient's prior history concern for possible strep pharyngitis versus peritonsillar abscess although posterior pharynx without any exudates or edema.  Other consideration include a UTI or PID as patient reports did vaginal discharge.  Will reassess patient after labs.  Differential Diagnosis:   Differential Diagnosis includes, but is not limited to:  Sepsis, bacteremia, UTI, pneumonia, cellulitis, abscess, indwelling line/catheter infection, cholecystitis, viral URI, gastroenteritis, viral syndrome, sinusitis, otitis media/externa, neoplasm, drug reaction, serotonin syndrome, intoxication/withdrawal syndrome.   Clinical Tests:   Lab Tests: Ordered and Reviewed  Radiological Study: Ordered and Reviewed  Medical Tests: Ordered and Reviewed             ED Course as of 06/15/24 2051   Fri Jun 14, 2024   2135 POC Lactate: 0.52 [AS]   2212 SARS-CoV-2 RNA, Amplification, Qual(!): Positive  Likely cause patient's fever will discontinue broad-spectrum antibiotics. [AS]   2226 Comprehensive metabolic panel(!)  Chem 14 negative for hypo-or hyper natremia, kalemia , chloridemia, or other  electrolyte abnormalities; BUN and creatinine (at baseline), ALT and AST were within normal limits indicating normal liver function.   [AS]   2226 CBC auto differential(!)  CBC without significant leukocytosis, anemia (at baseline), or platelet abnormalities.   [AS]   2311 Bacteria, UA(!): Many  Will empirically treat patient with ceftriaxone [AS]   2311 Bacteria - Vaginal Screen(!): Moderate [AS]   2341 hCG Qualitative, Urine: Negative [AS]   2345 Given patient's concern for possible STI will treat patient with doxycycline and Macrobid for UTI.  Pt is currently stable for discharge. I see no indication of an emergent process beyond that addressed during our encounter but have duly counseled the patient/family regarding the need for prompt follow-up as well as the indications that should prompt immediate return to the emergency room should new or worrisome developments occur. I discussed the ED work up and diagnostic findings with the patient/family. The patient/family has been provided with verbal and printed direction regarding our final diagnosis(es) as well as instructions regarding use of OTC and/or Rx medications intended to manage the patient's aforementioned conditions. The patient/family verbalized an understanding. The patient/family is asked if there are any questions or concerns. We discuss the case, until all issues are addressed to the patient/family's satisfaction. Patient/family understands and is agreeable to the plan.  [AS]      ED Course User Index  [AS] Carlito Caldwell MD          Medical Decision Making  Amount and/or Complexity of Data Reviewed  Labs: ordered. Decision-making details documented in ED Course.  Radiology: ordered.    Risk  OTC drugs.  Prescription drug management.           Clinical Impression:   Final diagnoses:  [R00.0] Tachycardia  [U07.1] COVID-19 (Primary)  [N39.0] Urinary tract infection without hematuria, site unspecified          ED Disposition Condition    Discharge  Stable          ED Prescriptions       Medication Sig Dispense Start Date End Date Auth. Provider    doxycycline (VIBRAMYCIN) 100 MG Cap Take 1 capsule (100 mg total) by mouth 2 (two) times daily. for 7 days 14 capsule 6/14/2024 6/21/2024 Carlito Caldwell MD    cephALEXin (KEFLEX) 500 MG capsule Take 1 capsule (500 mg total) by mouth 4 (four) times daily. for 7 days 28 capsule 6/14/2024 6/21/2024 Carlito Caldwell MD          Follow-up Information       Follow up With Specialties Details Why Contact Info    Neymar Griffith MD Obstetrics, Obstetrics and Gynecology Schedule an appointment as soon as possible for a visit  for reassesment [address] [facility] [address]  [phone]      [facility] [place], [place] Emergency Dept Emergency Medicine  If symptoms worsen [address] [facility] [place] [address] [phone]            DISCLAIMER: This note was prepared with [employer] voice recognition transcription software. Garbled syntax, mangled pronouns, and other bizarre constructions may be attributed to that software system.     Carlito Caldwell MD  06/15/24 7524

## 2024-06-15 NOTE — ED NOTES
Patient here for cough, sore throat, fever and chills x 3 days. Patient endorses hx of recurrent strep and thinks that her symptoms are related to that. Covid pos in triage. Febrile 100.7. Mildly tachycardic. Patient AAOx 4. Stable gait. Reports HA, generalized, feels like pressure.

## 2024-06-15 NOTE — DISCHARGE INSTRUCTIONS
Thank you for coming to our Emergency Department today. It is important to remember that some problems are difficult to diagnose and may not be found during your first visit. Be sure to follow up with your primary care doctor and review any labs/imaging that was performed with them. If you do not have a primary care doctor, you may contact the one listed on your discharge paperwork or you may also call the [facility] Appointment Desk at [phone] to schedule an appointment with one.     All medications may potentially have side effects and it is impossible to predict which medications may give you side effects. If you feel that you are having a negative effect of any medication you should immediately stop taking them and seek medical attention.    Return to the ER with any questions/concerns, new/concerning symptoms, worsening or failure to improve. Do not drive or make any important decisions for 24 hours if you have received any pain medications, sedatives or mood altering drugs during your ER visit.

## 2024-06-16 LAB
OHS QRS DURATION: 70 MS
OHS QTC CALCULATION: 436 MS

## 2024-06-17 LAB
BACTERIA UR CULT: NORMAL
C TRACH DNA SPEC QL NAA+PROBE: NOT DETECTED
N GONORRHOEA DNA SPEC QL NAA+PROBE: NOT DETECTED

## 2024-06-18 LAB
BACTERIA BLD CULT: NORMAL
BACTERIA BLD CULT: NORMAL

## 2024-09-19 ENCOUNTER — PATIENT MESSAGE (OUTPATIENT)
Dept: PRIMARY CARE CLINIC | Facility: CLINIC | Age: 29
End: 2024-09-19
Payer: MEDICAID

## 2024-09-19 DIAGNOSIS — Z30.09 FAMILY PLANNING: ICD-10-CM

## 2024-09-19 RX ORDER — ETONOGESTREL AND ETHINYL ESTRADIOL VAGINAL RING .015; .12 MG/D; MG/D
1 RING VAGINAL
Qty: 1 EACH | Refills: 6 | Status: SHIPPED | OUTPATIENT
Start: 2024-09-19 | End: 2025-09-19

## 2024-10-08 ENCOUNTER — OFFICE VISIT (OUTPATIENT)
Dept: OBSTETRICS AND GYNECOLOGY | Facility: CLINIC | Age: 29
End: 2024-10-08
Payer: MEDICAID

## 2024-10-08 VITALS
SYSTOLIC BLOOD PRESSURE: 162 MMHG | DIASTOLIC BLOOD PRESSURE: 88 MMHG | HEIGHT: 66 IN | BODY MASS INDEX: 33.66 KG/M2 | WEIGHT: 209.44 LBS

## 2024-10-08 DIAGNOSIS — Z01.419 WELL WOMAN EXAM WITH ROUTINE GYNECOLOGICAL EXAM: Primary | ICD-10-CM

## 2024-10-08 DIAGNOSIS — Z30.09 FAMILY PLANNING: ICD-10-CM

## 2024-10-08 DIAGNOSIS — N89.8 VAGINAL DISCHARGE: ICD-10-CM

## 2024-10-08 PROCEDURE — 87591 N.GONORRHOEAE DNA AMP PROB: CPT | Performed by: OBSTETRICS & GYNECOLOGY

## 2024-10-08 PROCEDURE — 87491 CHLMYD TRACH DNA AMP PROBE: CPT | Performed by: OBSTETRICS & GYNECOLOGY

## 2024-10-08 PROCEDURE — 99213 OFFICE O/P EST LOW 20 MIN: CPT | Mod: PBBFAC | Performed by: OBSTETRICS & GYNECOLOGY

## 2024-10-08 PROCEDURE — 99999 PR PBB SHADOW E&M-EST. PATIENT-LVL III: CPT | Mod: PBBFAC,,, | Performed by: OBSTETRICS & GYNECOLOGY

## 2024-10-08 NOTE — PROGRESS NOTES
Subjective:       Patient ID: Ghada Mckeon is a 29 y.o. female.    Chief Complaint:  Well Woman (Last normal pap was 2023. Pt currently on Nuva Ring.) and Vaginal Discharge (Pt with a vaginal discharge for about 2 weeks)        History of Present Illness  HPI  Annual Exam-Premenopausal  Patient presents for annual exam. The patient has no complaints today. The patient is sexually active. GYN screening history: Last normal pap was 2023.  The patient wears seatbelts: yes. The patient participates in regular exercise: yes. Has the patient ever been transfused or tattooed?:  yes/yes . The patient reports that there is not domestic violence in her life.    Now status post gastric sleeve 2024  Previous  sections x 2  History of pre-eclampsia and eclamptic seizure with her last delivery.  Does not want another child though her partner would like to try    Requesting STD screen  Doing well on NuvaRing. But having a discharge      GYN & OB History  Patient's last menstrual period was 2024 (within days).   Date of Last Pap: 2023    OB History    Para Term  AB Living   2 2 2 0 0 2   SAB IAB Ectopic Multiple Live Births   0 0 0   2      # Outcome Date GA Lbr Dawson/2nd Weight Sex Type Anes PTL Lv   2 Term 10/22/14 39w0d  3.43 kg (7 lb 9 oz) F CS-LTranv EPI  MARCY      Complications: Seizures During Labor, Eclampsia (toxemia of pregnancy)   1 Term 13 40w4d  3.487 kg (7 lb 11 oz) F CS-LTranv EPI N MARCY      Complications: Failed induction       Past Medical History:   Diagnosis Date    Depression     Hypertension        Past Surgical History:   Procedure Laterality Date     SECTION         Family History   Problem Relation Name Age of Onset    Hypertension Paternal Grandmother      Hypertension Maternal Grandmother      Diabetes Maternal Grandmother      Diabetes Maternal Grandfather      Heart failure Maternal Grandfather      Breast cancer Neg Hx      Colon cancer  Neg Hx      Ovarian cancer Neg Hx         Social History     Socioeconomic History    Marital status:    Tobacco Use    Smoking status: Some Days     Types: Vaping with nicotine    Smokeless tobacco: Current   Substance and Sexual Activity    Alcohol use: No    Drug use: No    Sexual activity: Yes     Partners: Male   Social History Narrative    Together since 2021    He works at [employer] in [place]    She works as a  at the [employer]       Current Outpatient Medications   Medication Sig Dispense Refill    etonogestreL-ethinyl estradioL (NUVARING) 0.12-0.015 mg/24 hr vaginal ring Place 1 each vaginally every 21 days. 1 each 6     No current facility-administered medications for this visit.       Review of patient's allergies indicates:   Allergen Reactions    Perflutren lipid microspheres Other (See Comments)     Chest tightness, back pain        Review of Systems  Review of Systems   Constitutional:  Negative for activity change, appetite change, chills, fatigue, fever and unexpected weight change.   HENT:  Negative for mouth sores.    Respiratory:  Negative for cough, shortness of breath and wheezing.    Cardiovascular:  Negative for chest pain and palpitations.   Gastrointestinal:  Negative for abdominal pain, bloating, blood in stool, constipation, nausea and vomiting.   Endocrine: Negative for diabetes and hot flashes.   Genitourinary:  Positive for vaginal discharge. Negative for dysmenorrhea, dyspareunia, dysuria, frequency, hematuria, menorrhagia, menstrual problem, pelvic pain, urgency, vaginal bleeding, vaginal pain, urinary incontinence, postcoital bleeding and vaginal odor.   Musculoskeletal:  Negative for back pain and myalgias.   Integumentary:  Negative for rash, breast mass and nipple discharge.   Neurological:  Negative for seizures and headaches.   Psychiatric/Behavioral:  Negative for depression and sleep disturbance. The patient is not nervous/anxious.     Breast: Negative for mass, mastodynia and nipple discharge          Objective:    Physical Exam:   Constitutional: She appears well-developed and well-nourished. No distress.   BMI of 33.80    HENT:   Head: Normocephalic and atraumatic.    Eyes: EOM are normal.      Pulmonary/Chest: Effort normal. No respiratory distress.   Breasts: Non-tender, no engorgement, no masses, no retraction, no discharge. Negative for lymphadenopathy.         Abdominal: Soft. She exhibits no distension. There is no abdominal tenderness. There is no rebound and no guarding.   Pfannenstiel scar       Genitourinary:    Vagina and uterus normal.   No vaginal discharge in the vagina.    Genitourinary Comments: Vulva without any obvious lesions.  Urethral meatus normal size and location without any lesion.  Urethra is non-tender without stricture or discharge.  Bladder is non-tender.  Vaginal vault with good support.  Minimal white discharge noted.  No obvious lesion.  Normal rugation.  Cervix is without any cervical motion tenderness.  No obvious lesion.  Uterus is small, non-tender, normal contour.  Adnexa is without any masses or tenderness.  Perineum without obvious lesion.    NuvaRing in vag             Musculoskeletal: Normal range of motion.       Neurological: She is alert.    Skin: Skin is warm and dry.    Psychiatric: She has a normal mood and affect.          Assessment:        1. Well woman exam with routine gynecological exam    2. Vaginal discharge    3. Family planning            Plan:          I have discussed with the patient her condition.  Monthly breast examination was instructed, discussed, and encouraged.  Patient was encouraged to consume a low-calorie, low fat diet, and to increase of physical activity.  Healthy habits encouraged.  A Pap smear was not performed according to the USPSTF recommendations.  Mammogram was not ordered because of the combination of her age and risk factors, according to ACOG guidelines.   Gonorrhea and Chlamydia testing performed;  HIV test offered, again according to guidelines.     We also discussed her obstetric history and CV risks.     Care Gaps addressed.    Continue with NuvaRing    She will come back to see me in one year for her annual visit.  She can come back to see me sooner as necessary.  All of her questions were answered appropriately to her satisfaction.          ** A female chaperone, Nikole Salgado, was present for the pelvic exam

## 2024-10-10 LAB
C TRACH DNA SPEC QL NAA+PROBE: NOT DETECTED
N GONORRHOEA DNA SPEC QL NAA+PROBE: NOT DETECTED

## 2024-12-11 ENCOUNTER — HOSPITAL ENCOUNTER (EMERGENCY)
Facility: HOSPITAL | Age: 29
Discharge: HOME | End: 2024-12-11
Attending: EMERGENCY MEDICINE
Payer: MEDICAID

## 2024-12-11 VITALS
RESPIRATION RATE: 18 BRPM | BODY MASS INDEX: 33.59 KG/M2 | SYSTOLIC BLOOD PRESSURE: 170 MMHG | HEART RATE: 85 BPM | DIASTOLIC BLOOD PRESSURE: 96 MMHG | OXYGEN SATURATION: 100 % | WEIGHT: 209 LBS | TEMPERATURE: 98 F | HEIGHT: 66 IN

## 2024-12-11 DIAGNOSIS — R07.89 CHEST TIGHTNESS: ICD-10-CM

## 2024-12-11 DIAGNOSIS — I10 HYPERTENSION, UNSPECIFIED TYPE: Primary | ICD-10-CM

## 2024-12-11 DIAGNOSIS — F41.1 ANXIETY REACTION: ICD-10-CM

## 2024-12-11 DIAGNOSIS — R07.9 CHEST PAIN: ICD-10-CM

## 2024-12-11 LAB
ALBUMIN SERPL BCP-MCNC: 3.2 G/DL (ref 3.5–5.2)
ALP SERPL-CCNC: 69 U/L (ref 40–150)
ALT SERPL W/O P-5'-P-CCNC: 15 U/L (ref 10–44)
ANION GAP SERPL CALC-SCNC: 8 MMOL/L (ref 8–16)
AST SERPL-CCNC: 12 U/L (ref 10–40)
B-HCG UR QL: NEGATIVE
BASOPHILS # BLD AUTO: 0.03 K/UL (ref 0–0.2)
BASOPHILS NFR BLD: 0.4 % (ref 0–1.9)
BILIRUB SERPL-MCNC: 0.1 MG/DL (ref 0.1–1)
BUN SERPL-MCNC: 22 MG/DL (ref 6–20)
CALCIUM SERPL-MCNC: 8.7 MG/DL (ref 8.7–10.5)
CHLORIDE SERPL-SCNC: 110 MMOL/L (ref 95–110)
CO2 SERPL-SCNC: 22 MMOL/L (ref 23–29)
CREAT SERPL-MCNC: 1.1 MG/DL (ref 0.5–1.4)
CTP QC/QA: YES
DIFFERENTIAL METHOD BLD: ABNORMAL
EOSINOPHIL # BLD AUTO: 0.1 K/UL (ref 0–0.5)
EOSINOPHIL NFR BLD: 1 % (ref 0–8)
ERYTHROCYTE [DISTWIDTH] IN BLOOD BY AUTOMATED COUNT: 15.4 % (ref 11.5–14.5)
EST. GFR  (NO RACE VARIABLE): >60 ML/MIN/1.73 M^2
GLUCOSE SERPL-MCNC: 122 MG/DL (ref 70–110)
HCT VFR BLD AUTO: 35.3 % (ref 37–48.5)
HGB BLD-MCNC: 11.2 G/DL (ref 12–16)
IMM GRANULOCYTES # BLD AUTO: 0.02 K/UL (ref 0–0.04)
IMM GRANULOCYTES NFR BLD AUTO: 0.2 % (ref 0–0.5)
LYMPHOCYTES # BLD AUTO: 2 K/UL (ref 1–4.8)
LYMPHOCYTES NFR BLD: 23.9 % (ref 18–48)
MCH RBC QN AUTO: 25.3 PG (ref 27–31)
MCHC RBC AUTO-ENTMCNC: 31.7 G/DL (ref 32–36)
MCV RBC AUTO: 80 FL (ref 82–98)
MONOCYTES # BLD AUTO: 0.4 K/UL (ref 0.3–1)
MONOCYTES NFR BLD: 4.9 % (ref 4–15)
NEUTROPHILS # BLD AUTO: 5.9 K/UL (ref 1.8–7.7)
NEUTROPHILS NFR BLD: 69.6 % (ref 38–73)
NRBC BLD-RTO: 0 /100 WBC
PLATELET # BLD AUTO: 311 K/UL (ref 150–450)
PMV BLD AUTO: 10.3 FL (ref 9.2–12.9)
POTASSIUM SERPL-SCNC: 3.4 MMOL/L (ref 3.5–5.1)
PROT SERPL-MCNC: 7.5 G/DL (ref 6–8.4)
RBC # BLD AUTO: 4.43 M/UL (ref 4–5.4)
SODIUM SERPL-SCNC: 140 MMOL/L (ref 136–145)
TROPONIN I SERPL DL<=0.01 NG/ML-MCNC: 0.01 NG/ML (ref 0–0.03)
WBC # BLD AUTO: 8.4 K/UL (ref 3.9–12.7)

## 2024-12-11 PROCEDURE — 80053 COMPREHEN METABOLIC PANEL: CPT | Performed by: EMERGENCY MEDICINE

## 2024-12-11 PROCEDURE — 93005 ELECTROCARDIOGRAM TRACING: CPT

## 2024-12-11 PROCEDURE — 93010 ELECTROCARDIOGRAM REPORT: CPT | Mod: ,,, | Performed by: INTERNAL MEDICINE

## 2024-12-11 PROCEDURE — 81025 URINE PREGNANCY TEST: CPT

## 2024-12-11 PROCEDURE — 84484 ASSAY OF TROPONIN QUANT: CPT | Performed by: EMERGENCY MEDICINE

## 2024-12-11 PROCEDURE — 99285 EMERGENCY DEPT VISIT HI MDM: CPT | Mod: 25

## 2024-12-11 PROCEDURE — 85025 COMPLETE CBC W/AUTO DIFF WBC: CPT | Performed by: EMERGENCY MEDICINE

## 2024-12-12 NOTE — ED PROVIDER NOTES
"Encounter Date: 2024       History     Chief Complaint   Patient presents with    Chest Pain     Pt arrived to the ED due to chest tightness, h/a, and anxiety after smoking "hookah" tonight. Pt reports a hx of panic attacks and a heart murmur      The patient smoking her Hooka in the bathtub and describes a panic attack/palpitations.  Patient states that she felt very very anxious and this lasted for a couple of minutes in his now resolved.  Patient has no complaints of at this time.    The history is provided by the patient.     Review of patient's allergies indicates:   Allergen Reactions    Perflutren lipid microspheres Other (See Comments)     Chest tightness, back pain     Past Medical History:   Diagnosis Date    Depression     Hypertension      Past Surgical History:   Procedure Laterality Date     SECTION       Family History   Problem Relation Name Age of Onset    Hypertension Paternal Grandmother      Hypertension Maternal Grandmother      Diabetes Maternal Grandmother      Diabetes Maternal Grandfather      Heart failure Maternal Grandfather      Breast cancer Neg Hx      Colon cancer Neg Hx      Ovarian cancer Neg Hx       Social History     Tobacco Use    Smoking status: Some Days     Types: Vaping with nicotine    Smokeless tobacco: Current   Substance Use Topics    Alcohol use: No    Drug use: No     Review of Systems   Constitutional: Negative.    HENT: Negative.     Eyes: Negative.    Respiratory: Negative.     Cardiovascular: Negative.    Gastrointestinal: Negative.    Endocrine: Negative.    Genitourinary: Negative.    Musculoskeletal: Negative.    Skin: Negative.    Allergic/Immunologic: Negative.    Neurological: Negative.    Hematological: Negative.    Psychiatric/Behavioral: Negative.     All other systems reviewed and are negative.      Physical Exam     Initial Vitals [24]   BP Pulse Resp Temp SpO2   (!) 202/91 110 20 97.9 °F (36.6 °C) 100 %      MAP       --     "     Physical Exam    Nursing note and vitals reviewed.  Constitutional: Vital signs are normal. She appears well-developed. She is active and cooperative.   HENT:   Head: Normocephalic and atraumatic.   Eyes: Conjunctivae, EOM and lids are normal. Pupils are equal, round, and reactive to light.   Neck: Trachea normal. Neck supple. No thyroid mass present.    Full passive range of motion without pain.     Cardiovascular:  Normal rate, regular rhythm, S1 normal, S2 normal, normal heart sounds, intact distal pulses and normal pulses.           Pulmonary/Chest: Effort normal and breath sounds normal.   Abdominal: Abdomen is soft and protuberant. Bowel sounds are normal. There is no abdominal tenderness.   Musculoskeletal:         General: Normal range of motion.      Cervical back: Full passive range of motion without pain and neck supple.     Lymphadenopathy:     She has no axillary adenopathy.   Neurological: She is alert and oriented to person, place, and time.   Skin: Skin is warm, dry and intact.   Psychiatric: She has a normal mood and affect. Her speech is normal and behavior is normal. Judgment and thought content normal. Cognition and memory are normal.         ED Course   Procedures  Labs Reviewed   CBC W/ AUTO DIFFERENTIAL - Abnormal       Result Value    WBC 8.40      RBC 4.43      Hemoglobin 11.2 (*)     Hematocrit 35.3 (*)     MCV 80 (*)     MCH 25.3 (*)     MCHC 31.7 (*)     RDW 15.4 (*)     Platelets 311      MPV 10.3      Immature Granulocytes 0.2      Gran # (ANC) 5.9      Immature Grans (Abs) 0.02      Lymph # 2.0      Mono # 0.4      Eos # 0.1      Baso # 0.03      nRBC 0      Gran % 69.6      Lymph % 23.9      Mono % 4.9      Eosinophil % 1.0      Basophil % 0.4      Differential Method Automated     COMPREHENSIVE METABOLIC PANEL - Abnormal    Sodium 140      Potassium 3.4 (*)     Chloride 110      CO2 22 (*)     Glucose 122 (*)     BUN 22 (*)     Creatinine 1.1      Calcium 8.7      Total Protein  7.5      Albumin 3.2 (*)     Total Bilirubin 0.1      Alkaline Phosphatase 69      AST 12      ALT 15      eGFR >60      Anion Gap 8     TROPONIN I    Troponin I 0.015     POCT URINE PREGNANCY    POC Preg Test, Ur Negative       Acceptable Yes       EKG Readings: (Independently Interpreted)   Rhythm: Normal Sinus Rhythm. Heart Rate: 97. Ectopy: No Ectopy. Conduction: Normal. ST Segments: Normal ST Segments. T Waves: Normal. Axis: Normal. Clinical Impression: Normal Sinus Rhythm       Imaging Results              X-Ray Chest AP Portable (Final result)  Result time 12/11/24 21:25:33      Final result by Geni Reddy MD (12/11/24 21:25:33)                   Impression:      No acute cardiopulmonary process identified.      Electronically signed by: Geni Reddy MD  Date:    12/11/2024  Time:    21:25               Narrative:    EXAMINATION:  XR CHEST AP PORTABLE    CLINICAL HISTORY:  Chest pain, unspecified    TECHNIQUE:  Single frontal view of the chest was performed.    COMPARISON:  06/14/2024.    FINDINGS:  Cardiac silhouette is normal in size.  Lungs are symmetrically expanded.  No evidence of focal consolidative process, pneumothorax, or significant pleural effusion.  No acute osseous abnormality identified.                                       Medications - No data to display  Medical Decision Making  This present the patient presented with palpitations secondary to his smoking hookah.  Patient has no complaints upon arrival here in the emergency department.  I believe her findings are most consistent with a anxiety reaction likely related to the use of her hookah.  Patient's blood pressure was elevated here in the emergency department I encouraged her to discontinue her tobacco use and monitor her blood pressure and follow up with the primary health care provider.    Amount and/or Complexity of Data Reviewed  Labs: ordered.               ED Course as of 12/11/24 2153   Wed Dec 11, 2024    2129 CBC with an H&H of 11 and 35 and normal platelets urine pregnancy test is negative [MI]   2146 The CMP potassium at 3.4 CO2 22 glucose 122 and BUN 22.  Otherwise unremarkable. [MI]   2148 The patient has normalized her blood pressure here in the emergency department we are giving the patient instructions to follow up with the primary health care provider and monitor her blood pressure on a close basis. [MI]   2151 The troponin was negative. [MI]      ED Course User Index  [MI] Neymar Weinstein MD                           Clinical Impression:  Final diagnoses:  [R07.89] Chest tightness  [R07.9] Chest pain  [I10] Hypertension, unspecified type (Primary)  [F41.1] Anxiety reaction                 Neymar Weinstein MD  12/11/24 2153       Neymar Weinstein MD  12/11/24 2159

## 2024-12-12 NOTE — ED TRIAGE NOTES
"Patient presents c/o "panic attack" after smoking a hooka while at home tonight. Reports after experiencing the panic attack, she had brief period of chest tightness and RT sided frontal HA. Reports chest tightness resolved PTA but wanted to get evaluated on the safe side.   " no

## 2024-12-13 LAB
OHS QRS DURATION: 74 MS
OHS QTC CALCULATION: 459 MS

## 2024-12-26 ENCOUNTER — OFFICE VISIT (OUTPATIENT)
Facility: CLINIC | Age: 29
End: 2024-12-26
Payer: MEDICAID

## 2024-12-26 ENCOUNTER — PATIENT OUTREACH (OUTPATIENT)
Dept: ADMINISTRATIVE | Facility: OTHER | Age: 29
End: 2024-12-26
Payer: MEDICAID

## 2024-12-26 VITALS
HEART RATE: 82 BPM | WEIGHT: 207.88 LBS | OXYGEN SATURATION: 97 % | TEMPERATURE: 98 F | BODY MASS INDEX: 31.5 KG/M2 | HEIGHT: 68 IN | SYSTOLIC BLOOD PRESSURE: 170 MMHG | DIASTOLIC BLOOD PRESSURE: 90 MMHG | RESPIRATION RATE: 18 BRPM

## 2024-12-26 DIAGNOSIS — Z00.00 ANNUAL PHYSICAL EXAM: Primary | ICD-10-CM

## 2024-12-26 DIAGNOSIS — F41.9 ANXIETY: ICD-10-CM

## 2024-12-26 DIAGNOSIS — Z13.84 ENCOUNTER FOR SCREENING FOR DENTAL DISORDER: ICD-10-CM

## 2024-12-26 DIAGNOSIS — I10 HYPERTENSION, UNSPECIFIED TYPE: ICD-10-CM

## 2024-12-26 PROCEDURE — 99214 OFFICE O/P EST MOD 30 MIN: CPT | Mod: PBBFAC,PN

## 2024-12-26 PROCEDURE — 99999 PR PBB SHADOW E&M-EST. PATIENT-LVL IV: CPT | Mod: PBBFAC,,,

## 2024-12-26 RX ORDER — AMLODIPINE BESYLATE 5 MG/1
5 TABLET ORAL DAILY
Qty: 30 TABLET | Refills: 0 | Status: SHIPPED | OUTPATIENT
Start: 2024-12-26 | End: 2025-01-25

## 2024-12-26 NOTE — PROGRESS NOTES
CHW - Initial Contact    This Community Health Worker completed the Social Determinant of Health questionnaire with  [id number] in clinic today.    Pt identified barriers of most importance are: NONE   Referrals to community agencies completed with patient/caregiver consent outside of [facility] include: NONE  Referrals were put through [facility] - : NO  Support and Services: No support & services have been documented.  Other information discussed the patient needs / wants help with: NONE   Follow up required: NO  No future outreach task assigned

## 2024-12-26 NOTE — PROGRESS NOTES
SUBJECTIVE     Chief Complaint   Patient presents with    [facility]    Panic Attack       HPI  Ghada Mckeon is a 29 y.o. female with multiple medical diagnoses as listed in the medical history and problem list that presents for establishment of care.     History of Present Illness    CHIEF COMPLAINT:  Ms. Mckeon presents today for a new patient visit.    RECENT HOSPITAL VISIT:  She had an emergency room visit on 12/11 due to elevated blood pressure of 202/91 and a panic attack triggered by smoking hookah. During the panic attack, she experienced headache, chest tightness, and racing heart. She does not take medication for hypertension.    MEDICAL HISTORY:  She reports occasional blurred vision.    SURGICAL HISTORY:  Her surgical history includes two C-sections and a gastric sleeve surgery performed on February 7th, 2024.    FAMILY HISTORY:  She has a family history of hypertension on both sides, with her mother having high blood pressure. Her father has diabetes and alcohol abuse.    SOCIAL HISTORY:  She smokes hookah and drinks alcohol approximately twice monthly.    ALLERGIES:  She has an allergy to perflutren lipid microspheres (in contrast dye).      ROS:  General: -fever, -chills, -fatigue, -weight gain, -weight loss  Eyes: +vision changes, -redness, -discharge  ENT: -ear pain, -nasal congestion, -sore throat  Cardiovascular: -chest pain, +palpitations, -lower extremity edema, +chest tightness  Respiratory: -cough, -shortness of breath  Gastrointestinal: -abdominal pain, -nausea, -vomiting, -diarrhea, -constipation, -blood in stool  Genitourinary: -dysuria, -hematuria, -frequency  Musculoskeletal: -joint pain, -muscle pain  Skin: -rash, -lesion  Neurological: +headache, -dizziness, -numbness, -tingling  Psychiatric: -anxiety, -depression, -sleep difficulty         PAST MEDICAL HISTORY:  Past Medical History:   Diagnosis Date    Depression     Hypertension     Panic attacks        PAST SURGICAL  HISTORY:  Past Surgical History:   Procedure Laterality Date     SECTION      gastric sleeve          SOCIAL HISTORY:  Social History     Socioeconomic History    Marital status:     Number of children: 2   Occupational History    Occupation: [employer]    Tobacco Use    Smoking status: Some Days     Types: Cigars    Smokeless tobacco: Current   Substance and Sexual Activity    Alcohol use: Yes     Comment: Barely drink twice month    Drug use: No    Sexual activity: Yes     Partners: Male     Birth control/protection: Inserts   Social History Narrative    Together since     He works at [employer] in the [facility] [place]    She works as a  at the [employer]     [employer]     Financial Resource Strain: Low Risk  (2024)    Overall Financial Resource Strain (CARDIA)     Difficulty of Paying Living Expenses: Not hard at all   Food Insecurity: No Food Insecurity (2024)    Hunger Vital Sign     Worried About Running Out of Food in the Last Year: Never true     Ran Out of Food in the Last Year: Never true   Recent Concern: Food Insecurity - Food Insecurity Present (2024)    Hunger Vital Sign     Worried About Running Out of Food in the Last Year: Sometimes true     Ran Out of Food in the Last Year: Never true   Transportation Needs: No Transportation Needs (2024)    PRAPARE - Transportation     Lack of Transportation (Medical): No     Lack of Transportation (Non-Medical): No   Physical Activity: Inactive (2024)    Exercise Vital Sign     Days of Exercise per Week: 0 days     Minutes of Exercise per Session: 0 min   Stress: No Stress Concern Present (2024)    [place] [employer] [place] [employer] - Occupational Stress Questionnaire     Feeling of Stress : Only a little   Housing Stability: Low Risk  (2024)    Housing Stability Vital Sign     Unable to Pay for Housing in the Last Year: No      "Homeless in the Last Year: No       FAMILY HISTORY:  Family History   Problem Relation Name Age of Onset    No Known Problems Mother          prediabetes    Alcohol abuse Father Jarocho     Diabetes Father Jarocho     Hypertension Father Jarocho     Hypertension Maternal Grandmother Gisel     Diabetes Maternal Grandmother Gisel     Diabetes Maternal Grandfather Hector     Heart failure Maternal Grandfather Hector     Hypertension Paternal Grandmother      Breast cancer Neg Hx      Colon cancer Neg Hx      Ovarian cancer Neg Hx         ALLERGIES AND MEDICATIONS: updated and reviewed.  Review of patient's allergies indicates:   Allergen Reactions    Perflutren lipid microspheres Other (See Comments)     Chest tightness, back pain     Current Outpatient Medications   Medication Sig Dispense Refill    etonogestreL-ethinyl estradioL (NUVARING) 0.12-0.015 mg/24 hr vaginal ring Place 1 each vaginally every 21 days. 1 each 6    amLODIPine (NORVASC) 5 MG tablet Take 1 tablet (5 mg total) by mouth once daily. 30 tablet 0     No current facility-administered medications for this visit.     OBJECTIVE     Physical Exam  Vitals:    12/26/24 1502   BP: (!) 170/90   Pulse:    Resp:    Temp:     Body mass index is 31.84 kg/m².  Weight: 94.3 kg (207 lb 14.3 oz)   Height: 5' 7.75" (172.1 cm)     Physical Exam  Constitutional:       Appearance: Normal appearance.   HENT:      Right Ear: Tympanic membrane normal. There is no impacted cerumen.      Left Ear: Tympanic membrane normal. There is no impacted cerumen.      Nose: Nose normal. No congestion or rhinorrhea.      Mouth/Throat:      Mouth: Mucous membranes are moist.      Pharynx: Oropharynx is clear.   Eyes:      General:         Right eye: No discharge.         Left eye: No discharge.      Conjunctiva/sclera: Conjunctivae normal.   Cardiovascular:      Rate and Rhythm: Normal rate and regular rhythm.      Pulses: Normal pulses.      Heart sounds: Normal heart sounds.   Pulmonary:      " Effort: Pulmonary effort is normal. No respiratory distress.      Breath sounds: Normal breath sounds. No wheezing.   Chest:      Chest wall: No tenderness.   Abdominal:      General: Bowel sounds are normal. There is no distension.      Palpations: Abdomen is soft.      Tenderness: There is no abdominal tenderness.   Musculoskeletal:         General: No swelling, tenderness or signs of injury. Normal range of motion.      Cervical back: Normal range of motion. No rigidity or tenderness.      Right lower leg: No edema.      Left lower leg: No edema.   Skin:     General: Skin is warm and dry.      Coloration: Skin is not pale.      Findings: No erythema, lesion or rash.   Neurological:      Mental Status: She is alert and oriented to person, place, and time.   Psychiatric:         Mood and Affect: Mood normal.         Behavior: Behavior normal.       Health Maintenance         Date Due Completion Date    Pneumococcal Vaccines (Age 0-49) (1 of 2 - PCV) Never done ---    Cervical Cancer Screening 08/17/2024 8/17/2023    Influenza Vaccine (1) 09/01/2024 11/2/2022    COVID-19 Vaccine (3 - 2024-25 season) 12/26/2025 (Originally 9/1/2024) 3/26/2021    TETANUS VACCINE 07/22/2032 7/22/2022    RSV Vaccine (Age 60+ and Pregnant patients) (1 - 1-dose 75+ series) 10/05/2069 ---              ASSESSMENT     29 y.o. female with     1. Annual physical exam    2. Encounter for screening for dental disorder    3. Hypertension, unspecified type    4. Anxiety        PLAN:     1. Annual physical exam  Counseled on age appropriate medical preventative services, including age appropriate cancer screenings, over all nutritional health, need for a consistent exercise regimen and an over all push towards maintaining a vigorous and active lifestyle.  Counseled on age appropriate vaccines and discussed upcoming health care needs based on age/gender.  Spent time with patient counseling on need for a good patient/provider relationship moving  forward.  Discussed use of common OTC medications and supplements.  Discussed common dietary aids and use of caffeine and the need for good sleep hygiene and stress management.      2. Encounter for screening for dental disorder  Oral health screening completed at today's visit. All questions/concerns addressed.     3. Hypertension, unspecified type  New; Uncontrolled; Treating  -BP uncontrolled started Rx therapy today  -advised medication compliance, and avoid regular use of NSAIDs as it can increase BP.  -advised DASH diet, regular exercise, and weight loss  - as well as portion control.   -advised to keep regular BP logs and bring it back with each f/u visit.  -f/u with BP logs in 2 weeks if BP is not consistently <140/90  - amLODIPine (NORVASC) 5 MG tablet; Take 1 tablet (5 mg total) by mouth once daily.  Dispense: 30 tablet; Refill: 0    4. Anxiety  New; Assessing    Assessment & Plan    IMPRESSION:  - Patient presents with hypertension and recent ED visit for panic attack  - BP was 202/91 during ED visit, indicating severe hypertension  - Considered patient's family history of hypertension and current lifestyle factors  - Initiated antihypertensive medication due to severity of BP elevation  - Addressed anxiety symptoms, considered non-pharmacological management initially  - Recommend lifestyle modifications to support BP control    ESSENTIAL HYPERTENSION:  - Explained relationship between smoking (including hookah) and hypertension.  - Discussed mechanism of vasoconstriction and its impact on blood pressure.  - Educated on the importance of sodium restriction in hypertension management.  - Explained the connection between hypertension and kidney function and damage if long term uncontrolled.  - Ms. Mckeon to monitor blood pressure at home regularly.  - Recommend reducing sodium intake, especially in her seafood consumption.  - Recommend increasing physical activity.  - Started amlodipine 5 mg tablet daily  at night for hypertension.  - Blood pressure check ordered for 2 week follow up.    NICOTINE DEPENDENCE:  - Ms. Mckeon to avoid smoking and use of tobacco products, including hookah.    ANXIETY DISORDER:  - Ms. Mckeon should practice instructed box breathing technique for anxiety management and will discuss how effective it is or is not upon follow up visit.  - considerations made for Rx therapy if needed, will discuss upon follow up    GENERAL HEALTH:  - Discussed importance of regular dental care for overall health.    FOLLOW-UP:  - Follow up in 4 weeks for blood pressure follow-up.  - Ms. Mckeon to bring home blood pressure readings to next appointment.         Elmer Avilez DNP, APRN, FNP-BC [facility]  12/26/2024 2:57 PM    This note was generated with the assistance of ambient listening technology. Verbal consent was obtained by the patient and accompanying visitor(s) for the recording of patient appointment to facilitate this note. I attest to having reviewed and edited the generated note for accuracy, though some syntax or spelling errors may persist. Please contact the author of this note for any clarification.

## 2024-12-27 ENCOUNTER — CLINICAL SUPPORT (OUTPATIENT)
Facility: CLINIC | Age: 29
End: 2024-12-27
Payer: MEDICAID

## 2024-12-27 ENCOUNTER — PATIENT MESSAGE (OUTPATIENT)
Facility: CLINIC | Age: 29
End: 2024-12-27

## 2024-12-27 VITALS — SYSTOLIC BLOOD PRESSURE: 150 MMHG | DIASTOLIC BLOOD PRESSURE: 100 MMHG

## 2024-12-27 DIAGNOSIS — I10 HYPERTENSION, UNSPECIFIED TYPE: Primary | ICD-10-CM

## 2024-12-27 PROCEDURE — 99999 PR PBB SHADOW E&M-EST. PATIENT-LVL I: CPT | Mod: PBBFAC,,,

## 2024-12-27 PROCEDURE — 99211 OFF/OP EST MAY X REQ PHY/QHP: CPT | Mod: PBBFAC,PN

## 2025-01-08 ENCOUNTER — PATIENT MESSAGE (OUTPATIENT)
Dept: ADMINISTRATIVE | Facility: HOSPITAL | Age: 30
End: 2025-01-08
Payer: MEDICAID

## 2025-01-22 ENCOUNTER — PATIENT MESSAGE (OUTPATIENT)
Facility: CLINIC | Age: 30
End: 2025-01-22
Payer: MEDICAID

## 2025-01-22 DIAGNOSIS — I10 HYPERTENSION, UNSPECIFIED TYPE: ICD-10-CM

## 2025-01-23 RX ORDER — AMLODIPINE BESYLATE 5 MG/1
5 TABLET ORAL DAILY
Qty: 30 TABLET | Refills: 0 | Status: SHIPPED | OUTPATIENT
Start: 2025-01-23 | End: 2025-02-22

## 2025-02-17 ENCOUNTER — OFFICE VISIT (OUTPATIENT)
Facility: CLINIC | Age: 30
End: 2025-02-17
Payer: MEDICAID

## 2025-02-17 VITALS
WEIGHT: 202.38 LBS | BODY MASS INDEX: 31.76 KG/M2 | RESPIRATION RATE: 18 BRPM | DIASTOLIC BLOOD PRESSURE: 80 MMHG | HEIGHT: 67 IN | OXYGEN SATURATION: 97 % | HEART RATE: 67 BPM | SYSTOLIC BLOOD PRESSURE: 130 MMHG | TEMPERATURE: 98 F

## 2025-02-17 DIAGNOSIS — I10 HYPERTENSION, UNSPECIFIED TYPE: ICD-10-CM

## 2025-02-17 DIAGNOSIS — F41.9 ANXIETY: Primary | ICD-10-CM

## 2025-02-17 PROCEDURE — 99214 OFFICE O/P EST MOD 30 MIN: CPT | Mod: PBBFAC,PN

## 2025-02-17 RX ORDER — AMLODIPINE BESYLATE 5 MG/1
5 TABLET ORAL DAILY
Qty: 30 TABLET | Refills: 2 | Status: SHIPPED | OUTPATIENT
Start: 2025-02-17 | End: 2025-05-18

## 2025-02-17 NOTE — PROGRESS NOTES
SUBJECTIVE     Chief Complaint   Patient presents with    Follow-up     Pt is coming in for a f/u of htn       HPI  Ghada Mckeon is a 29 y.o. female with multiple medical diagnoses as listed in the medical history and problem list that presents for follow-up.     History of Present Illness    CHIEF COMPLAINT:  Ms. Mckeon presents today for blood pressure follow up    HYPERTENSION:  She reports well-controlled blood pressure on amlodipine 5mg and denies any adverse side affects.    ANXIETY:  Her anxiety has improved overall, though she experienced one possible episode while at the movies with associated hand tremors. She is not currently on any medications for anxiety nor therapy at this time.    SURGICAL HISTORY:  She underwent gastric sleeve surgery in February of previous year, with weight reduction from 265 lbs pre-operatively to current weight of 202 lbs.    CARDIAC HISTORY:  She was born with a heart murmur that does not require medication.    MEDICATIONS:  Current medications include amlodipine and Nuvaring for contraception. She is not currently taking vitamins but acknowledges need to restart multivitamin supplementation following sleeve surgery.      ROS:  General: -fever, -chills, -fatigue, -weight gain, -weight loss  Eyes: -vision changes, -redness, -discharge  ENT: -ear pain, -nasal congestion, -sore throat  Cardiovascular: -chest pain, -palpitations, -lower extremity edema  Respiratory: -cough, -shortness of breath  Gastrointestinal: -abdominal pain, -nausea, -vomiting, -diarrhea, -constipation, -blood in stool  Genitourinary: -dysuria, -hematuria, -frequency  Musculoskeletal: -joint pain, -muscle pain  Skin: -rash, -lesion  Neurological: -headache, -dizziness, -numbness, -tingling, +tremors  Psychiatric: -anxiety, -depression, -sleep difficulty         PAST MEDICAL HISTORY:  Past Medical History:   Diagnosis Date    Depression     Hypertension     Panic attacks        PAST SURGICAL  "HISTORY:  Past Surgical History:   Procedure Laterality Date     SECTION      gastric sleeve          SOCIAL HISTORY:  Social History[1]    FAMILY HISTORY:  Family History   Problem Relation Name Age of Onset    No Known Problems Mother          prediabetes    Alcohol abuse Father Jarocho     Diabetes Father Jarocho     Hypertension Father Jarocho     Hypertension Maternal Grandmother Gisel     Diabetes Maternal Grandmother Gisel     Diabetes Maternal Grandfather Hector     Heart failure Maternal Grandfather Hector     Hypertension Paternal Grandmother      Breast cancer Neg Hx      Colon cancer Neg Hx      Ovarian cancer Neg Hx         ALLERGIES AND MEDICATIONS: updated and reviewed.  Review of patient's allergies indicates:   Allergen Reactions    Perflutren lipid microspheres Other (See Comments)     Chest tightness, back pain     Current Medications[2]    OBJECTIVE     Physical Exam  Vitals:    25 0819   BP: 130/80   Pulse: 67   Resp: 18   Temp: 98 °F (36.7 °C)    Body mass index is 31.7 kg/m².  Weight: 91.8 kg (202 lb 6.1 oz)   Height: 5' 7" (170.2 cm)     Physical Exam  Constitutional:       Appearance: Normal appearance.   HENT:      Right Ear: Tympanic membrane normal. There is no impacted cerumen.      Left Ear: Tympanic membrane normal. There is no impacted cerumen.      Nose: Nose normal. No congestion or rhinorrhea.      Mouth/Throat:      Mouth: Mucous membranes are moist.      Pharynx: Oropharynx is clear.   Eyes:      General:         Right eye: No discharge.         Left eye: No discharge.      Conjunctiva/sclera: Conjunctivae normal.   Cardiovascular:      Rate and Rhythm: Normal rate and regular rhythm.      Pulses: Normal pulses.      Heart sounds: Normal heart sounds.   Pulmonary:      Effort: Pulmonary effort is normal. No respiratory distress.      Breath sounds: Normal breath sounds. No wheezing.   Chest:      Chest wall: No tenderness.   Abdominal:      General: Bowel sounds are " normal. There is no distension.      Palpations: Abdomen is soft.      Tenderness: There is no abdominal tenderness.   Musculoskeletal:         General: No swelling, tenderness or signs of injury. Normal range of motion.      Cervical back: Normal range of motion. No rigidity or tenderness.      Right lower leg: No edema.      Left lower leg: No edema.   Skin:     General: Skin is warm and dry.      Coloration: Skin is not pale.      Findings: No erythema, lesion or rash.   Neurological:      Mental Status: She is alert.   Psychiatric:         Mood and Affect: Mood normal.         Behavior: Behavior normal.         Health Maintenance         Date Due Completion Date    Pneumococcal Vaccines (Age 0-49) (1 of 2 - PCV) Never done ---    Cervical Cancer Screening 08/17/2024 8/17/2023    Influenza Vaccine (1) 09/01/2024 11/2/2022    COVID-19 Vaccine (3 - 2024-25 season) 12/26/2025 (Originally 9/1/2024) 3/26/2021    TETANUS VACCINE 07/22/2032 7/22/2022    RSV Vaccine (Age 60+ and Pregnant patients) (1 - 1-dose 75+ series) 10/05/2069 ---              ASSESSMENT     29 y.o. female with     1. Anxiety    2. Hypertension, unspecified type        PLAN:     1. Hypertension, unspecified type  Improved; treating  - amLODIPine (NORVASC) 5 MG tablet; Take 1 tablet (5 mg total) by mouth once daily.  Dispense: 30 tablet; Refill: 2    2. Anxiety  Improved; Assessed    Assessment & Plan    IMPRESSION:  - Assessed blood pressure control on current regimen  - Evaluated weight loss progress post-sleeve gastrectomy  - Considered patient's inquiry about alcohol consumption with current medication  - Noted improvement in anxiety symptoms without medication intervention    HYPERTENSION:  - Evaluated the patient's blood pressure, which has improved from previous reading of 150/100 to current reading of 130/80.  - Continued amlodipine at current dose of 5mg as the patient has been taking it regularly.  - Refilled amlodipine prescription.  -  Scheduled follow up in 4 months.  - Educated on minimal alcohol consumption while on blood pressure medication.    POST-BARIATRIC SURGERY:  - Noted that the patient underwent sleeve gastrectomy approximately 1 year ago.  - Documented weight loss of 63 lbs since surgery, from 265 lbs pre-surgery to current weight of 202 lbs.  - Discussed importance of continuing vitamins post-bariatric surgery.  - Instructed the patient to resume taking specific multivitamin supplementation required post-bariatric surgery.  - Noted that the patient has not been taking vitamins for a while.  - Emphasized the importance of taking vitamins.  - Recommend upgrading vitamin supplementation.  - Instructed the patient to resume taking vitamin D supplements.    CONGENITAL HEART MURMUR:  - Noted that the patient has a congenital heart murmur that does not require medication.  - Documented that the patient underwent a 3D cardiac imaging W Contrast injection prior to surgery.  - Confirmed that a stress test was not required after the 3D imaging.    FOLLOW UP:  - Instructed the patient to follow up at schedule appointment time in 4 months or sooner if any issues arise.   - Pt states understanding and aggreement with plan    Elmer Avilez, JESE, APRN, FNP-BC [facility]  02/17/2025 8:29 AM    This note was generated with the assistance of ambient listening technology. Verbal consent was obtained by the patient and accompanying visitor(s) for the recording of patient appointment to facilitate this note. I attest to having reviewed and edited the generated note for accuracy, though some syntax or spelling errors may persist. Please contact the author of this note for any clarification.          [1]   Social History  Socioeconomic History    Marital status:     Number of children: 2   Occupational History    Occupation: [employer]    Tobacco Use    Smoking status: Some Days     Types: Cigars    Smokeless  tobacco: Current   Substance and Sexual Activity    Alcohol use: Yes     Comment: Barely drink twice month    Drug use: No    Sexual activity: Yes     Partners: Male     Birth control/protection: Inserts   Social History Narrative    Together since 2021    He works at [employer] in the [employer]    She works as a  at the [employer]     Social Drivers of Health     Financial Resource Strain: Medium Risk (2/14/2025)    Overall Financial Resource Strain (CARDIA)     Difficulty of Paying Living Expenses: Somewhat hard   Food Insecurity: Food Insecurity Present (2/14/2025)    Hunger Vital Sign     Worried About Running Out of Food in the Last Year: Often true     Ran Out of Food in the Last Year: Often true   Transportation Needs: No Transportation Needs (2/14/2025)    PRAPARE - Transportation     Lack of Transportation (Medical): No     Lack of Transportation (Non-Medical): No   Physical Activity: Insufficiently Active (2/14/2025)    Exercise Vital Sign     Days of Exercise per Week: 1 day     Minutes of Exercise per Session: 10 min   Stress: Stress Concern Present (2/14/2025)    [employer] [place] [employer] - Occupational Stress Questionnaire     Feeling of Stress : To some extent   Housing Stability: Low Risk  (2/14/2025)    Housing Stability Vital Sign     Unable to Pay for Housing in the Last Year: No     Number of Times Moved in the Last Year: 0     Homeless in the Last Year: No   [2]   Current Outpatient Medications   Medication Sig Dispense Refill    etonogestreL-ethinyl estradioL (NUVARING) 0.12-0.015 mg/24 hr vaginal ring Place 1 each vaginally every 21 days. 1 each 6    amLODIPine (NORVASC) 5 MG tablet Take 1 tablet (5 mg total) by mouth once daily. 30 tablet 2     No current facility-administered medications for this visit.

## 2025-02-24 DIAGNOSIS — I10 HYPERTENSION, UNSPECIFIED TYPE: ICD-10-CM

## 2025-02-26 RX ORDER — AMLODIPINE BESYLATE 5 MG/1
5 TABLET ORAL DAILY
Qty: 30 TABLET | Refills: 2 | Status: SHIPPED | OUTPATIENT
Start: 2025-02-26 | End: 2025-05-27

## 2025-04-04 ENCOUNTER — PATIENT MESSAGE (OUTPATIENT)
Facility: CLINIC | Age: 30
End: 2025-04-04
Payer: MEDICAID

## 2025-04-07 ENCOUNTER — TELEPHONE (OUTPATIENT)
Facility: CLINIC | Age: 30
End: 2025-04-07
Payer: MEDICAID

## 2025-04-08 NOTE — TELEPHONE ENCOUNTER
Called and spoke with patient concerning most recent message. Spot with patient. Scheduled patient to see me in clinic this coming Wednesday morning 4/9/2025. Pt reports normal home Blood pressure readings of 130 over low 80s at this time. Instructed patient to reach out should she experience any new symptoms and provided instructions on reporting to ER.     Elmer Avilez DNP, FNP-BC

## 2025-04-09 ENCOUNTER — OFFICE VISIT (OUTPATIENT)
Facility: CLINIC | Age: 30
End: 2025-04-09
Payer: MEDICAID

## 2025-04-09 VITALS
HEIGHT: 67 IN | SYSTOLIC BLOOD PRESSURE: 130 MMHG | OXYGEN SATURATION: 97 % | DIASTOLIC BLOOD PRESSURE: 70 MMHG | BODY MASS INDEX: 33.13 KG/M2 | TEMPERATURE: 98 F | HEART RATE: 88 BPM | WEIGHT: 211.06 LBS | RESPIRATION RATE: 18 BRPM

## 2025-04-09 DIAGNOSIS — H57.89 REDNESS OF BOTH EYES: Primary | ICD-10-CM

## 2025-04-09 DIAGNOSIS — F41.9 ANXIETY: ICD-10-CM

## 2025-04-09 PROCEDURE — 1160F RVW MEDS BY RX/DR IN RCRD: CPT | Mod: CPTII,,,

## 2025-04-09 PROCEDURE — G2211 COMPLEX E/M VISIT ADD ON: HCPCS | Mod: S$PBB,,,

## 2025-04-09 PROCEDURE — 99215 OFFICE O/P EST HI 40 MIN: CPT | Mod: S$PBB,,,

## 2025-04-09 PROCEDURE — 1159F MED LIST DOCD IN RCRD: CPT | Mod: CPTII,,,

## 2025-04-09 PROCEDURE — 99214 OFFICE O/P EST MOD 30 MIN: CPT | Mod: PBBFAC,PN

## 2025-04-09 PROCEDURE — 99999 PR PBB SHADOW E&M-EST. PATIENT-LVL IV: CPT | Mod: PBBFAC,,,

## 2025-04-09 PROCEDURE — 3008F BODY MASS INDEX DOCD: CPT | Mod: CPTII,,,

## 2025-04-09 PROCEDURE — 3075F SYST BP GE 130 - 139MM HG: CPT | Mod: CPTII,,,

## 2025-04-09 PROCEDURE — 3078F DIAST BP <80 MM HG: CPT | Mod: CPTII,,,

## 2025-04-09 RX ORDER — HYDROXYZINE HYDROCHLORIDE 25 MG/1
25 TABLET, FILM COATED ORAL 3 TIMES DAILY
Qty: 30 TABLET | Refills: 0 | Status: SHIPPED | OUTPATIENT
Start: 2025-04-09 | End: 2025-04-19

## 2025-04-09 RX ORDER — OLOPATADINE HYDROCHLORIDE 1 MG/ML
1 SOLUTION/ DROPS OPHTHALMIC 2 TIMES DAILY
Qty: 5 ML | Refills: 0 | Status: SHIPPED | OUTPATIENT
Start: 2025-04-09 | End: 2025-04-19

## 2025-04-09 RX ORDER — ESCITALOPRAM OXALATE 20 MG/1
TABLET ORAL
Qty: 57 TABLET | Refills: 0 | Status: SHIPPED | OUTPATIENT
Start: 2025-04-09 | End: 2025-06-08

## 2025-04-09 NOTE — LETTER
April 9, 2025      [facility] [facility]  [address]  [place] [address]  Phone: [phone]  Fax: [phone]       Patient: Ghada Mckeon   YOB: 1995  Date of Visit: 04/09/2025    To Whom It May Concern:    Yaritza Mckeon  was at [facility] on 04/09/2025. The patient may return to work on 04/08/2025 with no restrictions. If you have any questions or concerns, or if I can be of further assistance, please do not hesitate to contact me.    Sincerely,    Elmer Avilez DNP, FNP-BC

## 2025-04-09 NOTE — LETTER
April 9, 2025      [facility] [place] [facility]  [address]  [place] [address]  Phone: [phone]  Fax: [phone]       Patient: Ghada Mckeon   YOB: 1995  Date of Visit: 04/09/2025    To Whom It May Concern:    Yaritza Mckeon  was at [facility] on 04/09/2025. The patient may return to work on 04/09/2025 with no restrictions. If you have any questions or concerns, or if I can be of further assistance, please do not hesitate to contact me.    Sincerely,    Elmer Avilez DNP, FNP-BC

## 2025-04-10 NOTE — PROGRESS NOTES
"  SUBJECTIVE     Chief Complaint   Patient presents with    Follow-up     Pt states she is coming in for blurred vision and concern of panic attacks        HPI  Ghada Mckeon is a 29 y.o. female with multiple medical diagnoses as listed in the medical history and problem list that presents for evaluation.    History of Present Illness    CHIEF COMPLAINT:  Ms. Mckeon presents today for anxiety and panic attacks she believes related to recent tobacco cessation.    ANXIETY:  She reports high anxiety due to recent smoke cessation as well as multiple stressors including school, work, and job security concerns. She experienced a recent panic attack while studying, noting difficulty completing assignments efficiently with tasks taking up to three hours instead of the expected 45 minutes. She acknowledges stress associated with retaining large amounts of information for her studies. Her work at the [employer] is "causing increased stress" and she expresses concern about potential job loss due to recent political developments.    TOBACCO USE:  She previously smoked 8 black and mild cigars per day and is currently on day 3 of self-directed tobacco cessation efforts and feels she has started feeling the increase of anxiety symptoms. She denies enrollment in formal smoking cessation program.    VISION:  She experiences blurry vision with positional changes, particularly when leaning back and then forward at work. She has never had an eye exam, reporting no prior vision problems.    EDUCATION:  She is currently taking pre-nursing prerequisites through online classes with regular Zoom sessions. She plans to continue her education by taking summer classes.    WEIGHT MANAGEMENT:  She reports current weight of 207 lbs with past history of bariatric surgery and desires to lose an additional 20 lbs. She denies being able to dedicate significant time to exercise due to current academic and work commitments but states she does plan to " "continue trying to work on dietary habits.      ROS:  General: -fever, -chills, -fatigue, -weight gain, -weight loss  Eyes: -vision changes, +redness, -discharge, +blurry vision  ENT: -ear pain, -nasal congestion, -sore throat  Cardiovascular: -chest pain, -palpitations, -lower extremity edema  Respiratory: -cough, -shortness of breath  Gastrointestinal: -abdominal pain, -nausea, -vomiting, -diarrhea, -constipation, -blood in stool  Genitourinary: -dysuria, -hematuria, -frequency  Musculoskeletal: -joint pain, -muscle pain  Skin: -rash, -lesion  Neurological: -headache, -dizziness, -numbness, -tingling  Psychiatric: +anxiety, -depression, -sleep difficulty, +panic attacks         PAST MEDICAL HISTORY:  Past Medical History:   Diagnosis Date    Depression     Hypertension     Panic attacks        PAST SURGICAL HISTORY:  Past Surgical History:   Procedure Laterality Date     SECTION      gastric sleeve          SOCIAL HISTORY:  Social History[1]    FAMILY HISTORY:  Family History   Problem Relation Name Age of Onset    No Known Problems Mother          prediabetes    Alcohol abuse Father Jarocho     Diabetes Father Jarocho     Hypertension Father Jarocho     Hypertension Maternal Grandmother Gisel     Diabetes Maternal Grandmother Gisel     Diabetes Maternal Grandfather Hector     Heart failure Maternal Grandfather Hector     Hypertension Paternal Grandmother      Breast cancer Neg Hx      Colon cancer Neg Hx      Ovarian cancer Neg Hx         ALLERGIES AND MEDICATIONS: updated and reviewed.  Review of patient's allergies indicates:   Allergen Reactions    Perflutren lipid microspheres Other (See Comments)     Chest tightness, back pain     Current Medications[2]    OBJECTIVE     Physical Exam  Vitals:    25 0817   BP: 130/70   Pulse: 88   Resp: 18   Temp: 98 °F (36.7 °C)    Body mass index is 33.06 kg/m².  Weight: 95.8 kg (211 lb 1.5 oz)   Height: 5' 7" (170.2 cm)     Physical Exam  Constitutional:       " Appearance: Normal appearance. She is obese.   HENT:      Right Ear: Tympanic membrane normal. There is no impacted cerumen.      Left Ear: Tympanic membrane normal. There is no impacted cerumen.      Nose: Nose normal. No congestion or rhinorrhea.      Mouth/Throat:      Mouth: Mucous membranes are moist.      Pharynx: Oropharynx is clear.   Eyes:      General:         Right eye: No discharge.         Left eye: No discharge.      Conjunctiva/sclera: Conjunctivae normal.   Cardiovascular:      Rate and Rhythm: Normal rate and regular rhythm.      Pulses: Normal pulses.      Heart sounds: Normal heart sounds.   Pulmonary:      Effort: Pulmonary effort is normal. No respiratory distress.      Breath sounds: Normal breath sounds. No wheezing.   Chest:      Chest wall: No tenderness.   Abdominal:      General: Bowel sounds are normal. There is no distension.      Palpations: Abdomen is soft.      Tenderness: There is no abdominal tenderness.   Musculoskeletal:         General: No swelling, tenderness or signs of injury. Normal range of motion.      Cervical back: Normal range of motion. No rigidity or tenderness.      Right lower leg: No edema.      Left lower leg: No edema.   Skin:     General: Skin is warm and dry.      Coloration: Skin is not pale.      Findings: No erythema, lesion or rash.   Neurological:      Mental Status: She is alert and oriented to person, place, and time.   Psychiatric:         Mood and Affect: Mood normal.         Behavior: Behavior normal.         Health Maintenance         Date Due Completion Date    Pneumococcal Vaccines (Age 0-49) (1 of 2 - PCV) Never done ---    Cervical Cancer Screening 08/17/2024 8/17/2023    Influenza Vaccine (1) 09/01/2024 11/2/2022    COVID-19 Vaccine (3 - 2024-25 season) 12/26/2025 (Originally 9/1/2024) 3/26/2021    TETANUS VACCINE 07/22/2032 7/22/2022    RSV Vaccine (Age 60+ and Pregnant patients) (1 - 1-dose 75+ series) 10/05/2069 ---              ASSESSMENT      29 y.o. female with     1. Redness of both eyes    2. Anxiety        PLAN:     1. Redness of both eyes  New; treating  - Ambulatory referral/consult to Optometry; Future  - olopatadine (PATANOL) 0.1 % ophthalmic solution; Place 1 drop into both eyes 2 (two) times daily. for 10 days  Dispense: 5 mL; Refill: 0    2. Anxiety  New; treating   - hydrOXYzine HCL (ATARAX) 25 MG tablet; Take 1 tablet (25 mg total) by mouth 3 (three) times daily. for 30 doses  Dispense: 30 tablet; Refill: 0  - EScitalopram oxalate (LEXAPRO) 20 MG tablet; Take 0.5 tablets (10 mg total) by mouth every evening for 7 days, THEN 1 tablet (20 mg total) every evening.  Dispense: 57 tablet; Refill: 0    Assessment & Plan    F41.0 Panic disorder [episodic paroxysmal anxiety]  F17.220 Nicotine dependence, chewing tobacco, uncomplicated  H53.8 Other visual disturbances  Z98.84 Bariatric surgery status    IMPRESSION:  - Assessed eye symptoms, noting redness and irritation could likely be due to rubbing due to patients comments of onset and behavior since beginning .  - Considered potential impact of recent BP medication initiation and history of uncontrolled HTN on ocular health.  - Determined need for anxiety management, opting for an SSRI to address ongoing symptoms as well as PRN antihistamine.  - Evaluated smoking cessation progress, acknowledging patients comments of potential for increased appetite as a side effect since stopping.    PANIC DISORDER:  - Evaluated the patient's reported panic attacks, possibly related to tobacco cessation, including a recent episode during academic work.  - Explained the distinction between anxiety and panic attacks, noting panic attacks' shorter duration compared to prolonged anxiety.  - Acknowledged multiple stressors contributing to anxiety and panic, including academic, occupational, and political concerns.  - Prescribed an SSRI medication for anxiety management.  - Instructed to take 1/2 tablet at bedtime  for 7 days, then increase to 1 whole tablet daily.  - Explained SSRI mechanism of action, including serotonin regulation and gradual onset and time of full effects over 4-6 weeks.  - Cautioned against abrupt discontinuation of SSRI due to potential mood destabilization.  - Prescribed an additional anti-anxiety medication to be taken 1 tablet 3 times daily as needed for acute anxiety or panic symptoms.  - Recommend box breathing technique for managing panic symptoms and provided instructions .    NICOTINE DEPENDENCE:  - Noted patient's history of heavy tobacco use, smoking 8 cigars daily.  - Monitored patient's progress in tobacco cessation, currently on day 3.  - Assessed patient's coping with cessation, noting missing smoking but disliking its effects.  - Inquired about participation in a smoking cessation program.  - Noted patient is self-managing tobacco cessation without a formal program.    VISUAL DISTURBANCES:  - Evaluated patient's reported blurry vision and glossy eyes at work, as well as eye rubbing and redness.  - Observed and assessed redness in patient's eyes during exam.  - Suggested eye issues could be related to untreated hypertension affecting retinal blood vessels.  - Instructed patient to avoid rubbing eyes.  - Prescribed eye drops and instructed to use as directed.  - Referred patient to an optometrist for a comprehensive eye exam due to other health conditions and the need for annual exam as well.    BARIATRIC SURGERY STATUS:  - Monitored patient's current weight of 207 lbs and noted goal to lose an additional 20 lbs.  - Discussed post-bariatric surgery stomach size with patient.  - Inquired about patient's post-surgery diet and habits.  - Recommend walking at least twice weekly for exercise to start for 30 mins.  - Recommend walking at least 2 times per week.         RTC in 8 weeks or sooner if needed    I spent a total of 45 minutes on the day of the visit.This includes face to face time and  non-face to face time preparing to see the patient (eg, review of tests), obtaining and/or reviewing separately obtained history, documenting clinical information in the electronic or other health record, independently interpreting results and communicating results to the patient/family/caregiver, or care coordinator.       Elmer Avilez DNP, APRN, FNP-BC  [facility]  04/10/2025 8:42 AM    This note was generated with the assistance of ambient listening technology. Verbal consent was obtained by the patient and accompanying visitor(s) for the recording of patient appointment to facilitate this note. I attest to having reviewed and edited the generated note for accuracy, though some syntax or spelling errors may persist. Please contact the author of this note for any clarification.          [1]   Social History  Socioeconomic History    Marital status:     Number of children: 2   Occupational History    Occupation: [employer]    Tobacco Use    Smoking status: Some Days     Types: Cigars    Smokeless tobacco: Current   Substance and Sexual Activity    Alcohol use: Yes     Comment: Barely drink twice month    Drug use: No    Sexual activity: Yes     Partners: Male     Birth control/protection: Inserts   Social History Narrative    Together since 2021    He works at [employer] in the [place]    She works as a  at the [employer]     Social [place]     Financial Resource Strain: Medium Risk (2/14/2025)    Overall Financial Resource Strain (CARDIA)     Difficulty of Paying Living Expenses: Somewhat hard   Food Insecurity: Food Insecurity Present (2/14/2025)    Hunger Vital Sign     Worried About Running Out of Food in the Last Year: Often true     Ran Out of Food in the Last Year: Often true   Transportation Needs: No Transportation Needs (2/14/2025)    PRAPARE - Transportation     Lack of Transportation (Medical): No     Lack of  Transportation (Non-Medical): No   Physical Activity: Insufficiently Active (2/14/2025)    Exercise Vital Sign     Days of Exercise per Week: 1 day     Minutes of Exercise per Session: 10 min   Stress: Stress Concern Present (2/14/2025)    [place] [employer] - Occupational Stress Questionnaire     Feeling of Stress : To some extent   Housing Stability: Low Risk  (2/14/2025)    Housing Stability Vital Sign     Unable to Pay for Housing in the Last Year: No     Number of Times Moved in the Last Year: 0     Homeless in the Last Year: No   [2]   Current Outpatient Medications   Medication Sig Dispense Refill    amLODIPine (NORVASC) 5 MG tablet Take 1 tablet (5 mg total) by mouth once daily. 30 tablet 2    etonogestreL-ethinyl estradioL (NUVARING) 0.12-0.015 mg/24 hr vaginal ring Place 1 each vaginally every 21 days. 1 each 6    EScitalopram oxalate (LEXAPRO) 20 MG tablet Take 0.5 tablets (10 mg total) by mouth every evening for 7 days, THEN 1 tablet (20 mg total) every evening. 57 tablet 0    hydrOXYzine HCL (ATARAX) 25 MG tablet Take 1 tablet (25 mg total) by mouth 3 (three) times daily. for 30 doses 30 tablet 0    olopatadine (PATANOL) 0.1 % ophthalmic solution Place 1 drop into both eyes 2 (two) times daily. for 10 days 5 mL 0     No current facility-administered medications for this visit.

## 2025-04-13 ENCOUNTER — HOSPITAL ENCOUNTER (EMERGENCY)
Facility: HOSPITAL | Age: 30
Discharge: HOME | End: 2025-04-13
Attending: STUDENT IN AN ORGANIZED HEALTH CARE EDUCATION/TRAINING PROGRAM
Payer: MEDICAID

## 2025-04-13 VITALS
HEIGHT: 67 IN | BODY MASS INDEX: 32.49 KG/M2 | WEIGHT: 207 LBS | HEART RATE: 80 BPM | OXYGEN SATURATION: 100 % | DIASTOLIC BLOOD PRESSURE: 81 MMHG | TEMPERATURE: 98 F | RESPIRATION RATE: 20 BRPM | SYSTOLIC BLOOD PRESSURE: 134 MMHG

## 2025-04-13 DIAGNOSIS — E86.0 MILD DEHYDRATION: ICD-10-CM

## 2025-04-13 DIAGNOSIS — R42 DIZZY: ICD-10-CM

## 2025-04-13 DIAGNOSIS — R51.9 RIGHT TEMPORAL HEADACHE: Primary | ICD-10-CM

## 2025-04-13 PROBLEM — E66.01 MORBID OBESITY: Status: ACTIVE | Noted: 2024-02-08

## 2025-04-13 LAB
GLUCOSE SERPL-MCNC: 72 MG/DL (ref 70–110)
POCT GLUCOSE: 72 MG/DL (ref 70–110)

## 2025-04-13 PROCEDURE — 93010 ELECTROCARDIOGRAM REPORT: CPT | Mod: ,,, | Performed by: INTERNAL MEDICINE

## 2025-04-13 PROCEDURE — 82962 GLUCOSE BLOOD TEST: CPT

## 2025-04-13 PROCEDURE — 99283 EMERGENCY DEPT VISIT LOW MDM: CPT | Mod: 25

## 2025-04-13 PROCEDURE — 25000003 PHARM REV CODE 250: Performed by: PHYSICIAN ASSISTANT

## 2025-04-13 PROCEDURE — 93005 ELECTROCARDIOGRAM TRACING: CPT

## 2025-04-13 RX ORDER — ACETAMINOPHEN 500 MG
1000 TABLET ORAL
Status: COMPLETED | OUTPATIENT
Start: 2025-04-13 | End: 2025-04-13

## 2025-04-13 RX ADMIN — ACETAMINOPHEN 1000 MG: 500 TABLET ORAL at 03:04

## 2025-04-13 NOTE — DISCHARGE INSTRUCTIONS
Thank you for coming to our Emergency Department today. It is important to remember that some problems or medical conditions are difficult to diagnose and may not be found or addressed during your Emergency Department visit.  These conditions often start with non-specific symptoms and can only be diagnosed on follow up visits with your primary care physician or specialist when the symptoms continue or change. Please remember that all medical conditions can change, and we cannot predict how you will be feeling tomorrow or the next day. Return to the ER with any questions/concerns, new/concerning symptoms, worsening or failure to improve.       Be sure to follow up with your primary care doctor and review all labs/imaging/tests that were performed during your ER visit with them. It is very common for us to identify non-emergent incidental findings which must be followed up with your primary care physician.  Some labs/imaging/tests may be outside of the normal range, and require non-emergent follow-up and/or further investigation/treatment/procedures/testing to help diagnose/exclude/prevent complications or other potentially serious medical conditions. Some abnormalities may not have been discussed or addressed during your ER visit.     An ER visit does not replace a primary care visit, and many screening tests or follow-up tests cannot be ordered by an ER doctor or performed by the ER. Some tests may even require pre-approval.    If you do not have a primary care doctor, you may contact the one listed on your discharge paperwork or you may also call the [facility] Appointment Desk at [phone] , or [address] [place] [address] at  [phone] to schedule an appointment, or establish care with a primary care doctor or even a specialist and to obtain information about local resources. It is important to your health that you have a primary care doctor.    Please take all medications as directed. We have done our best to select  a medication for you that will treat your condition however, all medications may potentially have side-effects and it is impossible to predict which medications may give you side-effects or what those side-effects (if any) those medications may give you.  If you feel that you are having a negative effect or side-effect of any medication you should stop taking those medications immediately and seek medical attention. If you feel that you are having a life-threatening reaction call 911.        Do not drive, swim, climb to height, take a bath, operate heavy machinery, drink alcohol or take potentially sedating medications, sign any legal documents or make any important decisions for 24 hours if you have received any pain medications, sedatives or mood altering drugs during your ER visit or within 24 hours of taking them if they have been prescribed to you.     You can find additional resources for Dentists, hearing aids, durable medical equipment, low cost pharmacies and other resources at [email or web address]

## 2025-04-13 NOTE — ED PROVIDER NOTES
"Encounter Date: 2025    SCRIBE #1 NOTE: I, Omer Yu, am scribing for, and in the presence of,  Shaun Hoff PA-C. I have scribed the following portions of the note - Other sections scribed: HPI, ROS.       History     Chief Complaint   Patient presents with    Dizziness     Pt reports walking her dog and suddenly became dizzy.  Pt states EMT was called but she did not go with them due to ride home.  Pt now just has a headache.       Ghada Mckeon is a 29 y.o. female, with a PMHx of depression, HTN, and panic attacks, who presents to the ED with dizziness just PTA. Patient reports associated symptoms of headaches and body shaking. She states that she was walking her dog prior to feeling dizzy. Notes that she has a headache prior to feeling dizzy and states that it worsens about an hour ago. She describes her dizziness like she was "about to pass out." Reports that she doesn't feel dizzy at this time but notes slight headache. Patient mentions that she sometimes experience panic attacks but states that she's not taking any medications for it. Reports that her panic attacks is not usually associated with dizziness. She states that she had gastric sleeve done in the past and thinks she is dehydrated since she's not drinking enough fluids. No other exacerbating or alleviating factors. Denies any attempted treatment at home. Denies photophobia, congestion, sinus pressure, chest pain, abdominal pain or other associated symptoms.       The history is provided by the patient. No  was used.     Review of patient's allergies indicates:   Allergen Reactions    Perflutren lipid microspheres Other (See Comments)     Chest tightness, back pain     Past Medical History:   Diagnosis Date    Depression     Hypertension     Panic attacks      Past Surgical History:   Procedure Laterality Date     SECTION      gastric sleeve        Family History   Problem Relation Name Age of " Onset    No Known Problems Mother          prediabetes    Alcohol abuse Father Jarocho     Diabetes Father Jarocho     Hypertension Father Jarocho     Hypertension Maternal Grandmother Gisel     Diabetes Maternal Grandmother Gisel     Diabetes Maternal Grandfather Hector     Heart failure Maternal Grandfather Hector     Hypertension Paternal Grandmother      Breast cancer Neg Hx      Colon cancer Neg Hx      Ovarian cancer Neg Hx       Social History[1]  Review of Systems   Constitutional:  Negative for fever.   HENT:  Negative for congestion, sinus pressure, sore throat and trouble swallowing.    Eyes:  Negative for photophobia.   Respiratory:  Negative for cough and shortness of breath.    Cardiovascular:  Negative for chest pain.   Gastrointestinal:  Negative for abdominal pain, constipation, diarrhea, nausea and vomiting.   Genitourinary:  Negative for dysuria, flank pain, frequency and urgency.   Musculoskeletal:  Negative for back pain.   Skin:  Negative for rash.   Neurological:  Positive for dizziness (resolved) and headaches.   All other systems reviewed and are negative.      Physical Exam     Initial Vitals [04/13/25 1504]   BP Pulse Resp Temp SpO2   134/81 80 20 98.2 °F (36.8 °C) 100 %      MAP       --         Physical Exam    Nursing note and vitals reviewed.  Constitutional: She appears well-developed and well-nourished. She is not diaphoretic. No distress.   HENT:   Head: Atraumatic.   Right Ear: External ear normal.   Left Ear: External ear normal.   Dry mucous membranes.  Oropharynx otherwise normal.  TMs, ear canals, and mastoids normal.   Eyes: Conjunctivae and EOM are normal.   Neck: No tracheal deviation present. No JVD present.   Normal range of motion.  Cardiovascular:  Normal rate and regular rhythm.           Pulmonary/Chest: No accessory muscle usage or stridor. No tachypnea. No respiratory distress.   Musculoskeletal:         General: Normal range of motion.      Cervical back: Normal range of  motion.     Neurological: She is alert and oriented to person, place, and time. She has normal strength. She displays no tremor. She displays no seizure activity. Coordination and gait normal.   Skin: Skin is intact. No rash noted. No pallor.         ED Course   Procedures  Labs Reviewed   POCT URINE PREGNANCY   POCT GLUCOSE MONITORING CONTINUOUS     EKG Readings: (Independently Interpreted)   Initial Reading: No STEMI. Rhythm: Normal Sinus Rhythm. Heart Rate: 69. Axis: Normal.       Imaging Results    None          Medications   acetaminophen tablet 1,000 mg (1,000 mg Oral Given 4/13/25 8587)     Medical Decision Making  History of panic attacks and is also currently trying to get off nicotine. Sudden onset of dizziness that has since resolved while being in the ED.  Only active symptom is right temporal headache that improves with Tylenol.  No focal deficits.  She does appear slightly dehydrated which could be contributing to her symptoms.  Glucose normal.  EKG reassuring.  No cardiopulmonary symptoms at this time.  TMs clear.  Follow-up with PCP.  Return precautions.  Agreeable to plan.  Feels comfortable going home.    Amount and/or Complexity of Data Reviewed  Labs: ordered. Decision-making details documented in ED Course.  ECG/medicine tests: ordered and independent interpretation performed. Decision-making details documented in ED Course.    Risk  OTC drugs.            Scribe Attestation:   Scribe #1: I performed the above scribed service and the documentation accurately describes the services I performed. I attest to the accuracy of the note.                           I, Shaun Hoff PA-C, personally performed the services described in this documentation. All medical record entries made by the scribe were at my direction and in my presence. I have reviewed the chart and agree that the record reflects my personal performance and is accurate and complete.      DISCLAIMER: This note was prepared with [employer]  voice recognition transcription software. Garbled syntax, mangled pronouns, and other bizarre constructions may be attributed to that software system.     Clinical Impression:  Final diagnoses:  [R42] Dizzy  [E86.0] Mild dehydration  [R51.9] Right temporal headache (Primary)          ED Disposition Condition    Discharge Stable          ED Prescriptions    None       Follow-up Information       Follow up With Specialties Details Why Contact Info    Marissa Frey MD Family Medicine Schedule an appointment as soon as possible for a visit in 1 day For re-evaluation [address] [place] [id number]  [phone]      [facility] Emergency Medicine Go to  If symptoms worsen or new symptoms develop [address] [facility] [place] [address] [phone]                 [1]   Social History  Tobacco Use    Smoking status: Some Days     Types: Cigars    Smokeless tobacco: Current   Substance Use Topics    Alcohol use: Yes     Comment: Barely drink twice month    Drug use: No        Shaun Hoff PADANIELLE  04/13/25 8531

## 2025-04-15 LAB
OHS QRS DURATION: 96 MS
OHS QTC CALCULATION: 426 MS

## 2025-04-28 DIAGNOSIS — Z30.09 FAMILY PLANNING: ICD-10-CM

## 2025-04-28 RX ORDER — ETONOGESTREL AND ETHINYL ESTRADIOL VAGINAL RING .015; .12 MG/D; MG/D
1 RING VAGINAL
Qty: 3 EACH | Refills: 0 | Status: SHIPPED | OUTPATIENT
Start: 2025-04-28 | End: 2026-04-28

## 2025-04-28 NOTE — TELEPHONE ENCOUNTER
Refill Routing Note   Medication(s) are not appropriate for processing by [facility] for the following reason(s):        Outside of protocol    [facility] action(s):  Route        Medication Therapy Plan: Active tobacco use      Appointments  past 12m or future 3m with PCP    Date Provider   Last Visit   10/8/2024 Ford Davison MD   Next Visit   Visit date not found Ford Davison MD   ED visits in past 90 days: 1        Note composed:9:46 AM 04/28/2025

## 2025-08-27 DIAGNOSIS — Z30.09 FAMILY PLANNING: ICD-10-CM

## 2025-08-27 DIAGNOSIS — I10 HYPERTENSION, UNSPECIFIED TYPE: ICD-10-CM

## 2025-08-28 RX ORDER — AMLODIPINE BESYLATE 5 MG/1
5 TABLET ORAL DAILY
Qty: 30 TABLET | Refills: 2 | Status: SHIPPED | OUTPATIENT
Start: 2025-08-28 | End: 2025-11-26

## 2025-08-28 RX ORDER — ETONOGESTREL AND ETHINYL ESTRADIOL VAGINAL RING .015; .12 MG/D; MG/D
1 RING VAGINAL
Qty: 3 EACH | Refills: 3 | Status: SHIPPED | OUTPATIENT
Start: 2025-08-28 | End: 2026-08-28